# Patient Record
Sex: FEMALE | Race: WHITE | NOT HISPANIC OR LATINO | Employment: UNEMPLOYED | URBAN - METROPOLITAN AREA
[De-identification: names, ages, dates, MRNs, and addresses within clinical notes are randomized per-mention and may not be internally consistent; named-entity substitution may affect disease eponyms.]

---

## 2018-08-11 ENCOUNTER — HOSPITAL ENCOUNTER (EMERGENCY)
Facility: HOSPITAL | Age: 48
Discharge: HOME | End: 2018-08-11
Attending: EMERGENCY MEDICINE
Payer: MEDICARE

## 2018-08-11 VITALS
SYSTOLIC BLOOD PRESSURE: 127 MMHG | BODY MASS INDEX: 27.82 KG/M2 | OXYGEN SATURATION: 97 % | RESPIRATION RATE: 16 BRPM | DIASTOLIC BLOOD PRESSURE: 74 MMHG | WEIGHT: 157 LBS | TEMPERATURE: 98 F | HEIGHT: 63 IN | HEART RATE: 70 BPM

## 2018-08-11 DIAGNOSIS — K59.00 CONSTIPATION, UNSPECIFIED CONSTIPATION TYPE: Primary | ICD-10-CM

## 2018-08-11 LAB
B-HCG UR QL: NEGATIVE
BACTERIA #/AREA URNS HPF: NORMAL /HPF
BILIRUB UR QL STRIP: NEGATIVE
CLARITY UR: ABNORMAL
COLOR UR: YELLOW
CTP QC/QA: YES
GLUCOSE UR QL STRIP: NEGATIVE
HGB UR QL STRIP: NEGATIVE
KETONES UR QL STRIP: NEGATIVE
LEUKOCYTE ESTERASE UR QL STRIP: NEGATIVE
MICROSCOPIC COMMENT: NORMAL
NITRITE UR QL STRIP: NEGATIVE
PH UR STRIP: 6 [PH] (ref 5–8)
PROT UR QL STRIP: NEGATIVE
SP GR UR STRIP: 1.01 (ref 1–1.03)
SQUAMOUS #/AREA URNS HPF: 6 /HPF
URN SPEC COLLECT METH UR: ABNORMAL
UROBILINOGEN UR STRIP-ACNC: NEGATIVE EU/DL
WBC #/AREA URNS HPF: 1 /HPF (ref 0–5)

## 2018-08-11 PROCEDURE — 25000003 PHARM REV CODE 250: Performed by: NURSE PRACTITIONER

## 2018-08-11 PROCEDURE — 99284 EMERGENCY DEPT VISIT MOD MDM: CPT | Mod: 25

## 2018-08-11 PROCEDURE — 81025 URINE PREGNANCY TEST: CPT | Performed by: NURSE PRACTITIONER

## 2018-08-11 PROCEDURE — 81000 URINALYSIS NONAUTO W/SCOPE: CPT

## 2018-08-11 RX ORDER — ONDANSETRON 4 MG/1
4 TABLET, FILM COATED ORAL EVERY 6 HOURS PRN
Qty: 12 TABLET | Refills: 0 | Status: SHIPPED | OUTPATIENT
Start: 2018-08-11

## 2018-08-11 RX ORDER — ONDANSETRON 4 MG/1
4 TABLET, ORALLY DISINTEGRATING ORAL
Status: COMPLETED | OUTPATIENT
Start: 2018-08-11 | End: 2018-08-11

## 2018-08-11 RX ORDER — MONTELUKAST SODIUM 10 MG/1
10 TABLET ORAL NIGHTLY
COMMUNITY

## 2018-08-11 RX ORDER — FLUOXETINE 10 MG/1
10 CAPSULE ORAL DAILY
COMMUNITY

## 2018-08-11 RX ADMIN — ONDANSETRON 4 MG: 4 TABLET, ORALLY DISINTEGRATING ORAL at 11:08

## 2018-08-11 NOTE — ED TRIAGE NOTES
Patient reports abdominal pain, NVD after having an increase in her Prozac from 10 mg to 30 mg.  Patient spoke with her PCP to inform her of her symptoms but could not get an appointment so she came to the ED. Denies fever.

## 2018-08-11 NOTE — DISCHARGE INSTRUCTIONS
One of the best ways to help treat constipation is to increase your fiber intake. You can do this either through diet or by using fiber supplements. Fiber (in whole grains, fruits, and vegetables) adds bulk and absorbs water to soften the stool.     Over-the-counter product to help ease your constipation.    Exercise helps improve the working of your colon which helps ease constipation.     Follow-up with primary care physician    Return to the Emergency Room if symptoms worsen or any other concerns.

## 2018-08-11 NOTE — ED PROVIDER NOTES
Encounter Date: 8/11/2018       History     Chief Complaint   Patient presents with    Abdominal Pain     reports intermittent abdominal pain x 2 wks since prozac dosage was increased; one emesis episode this morning       Abdominal Pain   The current episode started several weeks ago. The onset of the illness was gradual (PCP increase patient Prozac from 20mg to 30mg 3 weeks ago). The problem has not changed since onset.The abdominal pain is generalized. The abdominal pain does not radiate. The severity of the abdominal pain is 6/10. The abdominal pain is relieved by nothing. The other symptoms of the illness include nausea and vomiting (1 episoded this AM). The other symptoms of the illness do not include fever, fatigue, jaundice, shortness of breath, diarrhea, dysuria, vaginal discharge or vaginal bleeding. Primary symptoms comment: constipation.   Nausea began more than 1 week ago (intermittently). The nausea is associated with prescription drugs.   The vomiting began today. Vomiting occurred once. The emesis contains stomach contents.   The patient states that she believes she is currently not pregnant. The patient has not had a change in bowel habit. Additional symptoms associated with the illness include constipation. Symptoms associated with the illness do not include chills, anorexia, diaphoresis, heartburn, urgency, hematuria, frequency or back pain.     Review of patient's allergies indicates:   Allergen Reactions    Baclofen     Codeine      Past Medical History:   Diagnosis Date    Anxiety     Asthma     Depression     Hypertension      Past Surgical History:   Procedure Laterality Date    FOOT SURGERY Left      History reviewed. No pertinent family history.  Social History   Substance Use Topics    Smoking status: Never Smoker    Smokeless tobacco: Not on file    Alcohol use No     Review of Systems   Constitutional: Negative for chills, diaphoresis, fatigue and fever.   HENT: Negative for  congestion, ear discharge, ear pain, sinus pain, sinus pressure, sore throat and trouble swallowing.    Eyes: Negative for pain.   Respiratory: Negative for chest tightness and shortness of breath.    Cardiovascular: Negative for chest pain, palpitations and leg swelling.   Gastrointestinal: Positive for abdominal pain, constipation, nausea and vomiting (1 episoded this AM). Negative for anal bleeding, anorexia, blood in stool, diarrhea, heartburn, jaundice and rectal pain.   Genitourinary: Negative for dysuria, frequency, hematuria, urgency, vaginal bleeding and vaginal discharge.   Musculoskeletal: Negative for back pain, neck pain and neck stiffness.   Skin: Negative for rash.   Neurological: Negative for dizziness, facial asymmetry, weakness, light-headedness, numbness and headaches.   Psychiatric/Behavioral: Negative for agitation, behavioral problems, confusion and decreased concentration.       Physical Exam     Initial Vitals [08/11/18 1049]   BP Pulse Resp Temp SpO2   (!) 142/76 70 20 98.1 °F (36.7 °C) 99 %      MAP       --         Physical Exam    Nursing note and vitals reviewed.  Constitutional: Vital signs are normal. She appears well-developed and well-nourished. She is cooperative.  Non-toxic appearance.   HENT:   Head: Normocephalic and atraumatic.   Right Ear: Hearing and external ear normal.   Left Ear: Hearing and external ear normal.   Nose: Nose normal.   Mouth/Throat: Oropharynx is clear and moist.   Eyes: EOM are normal. Pupils are equal, round, and reactive to light.   Neck: Neck supple. No JVD present.   Cardiovascular: Normal rate, regular rhythm, normal heart sounds and intact distal pulses. Exam reveals no decreased pulses.    Pulses:       Carotid pulses are 2+ on the right side, and 2+ on the left side.       Radial pulses are 2+ on the right side, and 2+ on the left side.        Dorsalis pedis pulses are 2+ on the right side, and 2+ on the left side.   Pulmonary/Chest: Effort normal  and breath sounds normal.   Abdominal: Soft. Normal appearance and bowel sounds are normal. She exhibits no distension and no mass. There is no tenderness. There is no rigidity, no rebound, no guarding, no CVA tenderness, no tenderness at McBurney's point and negative Hardy's sign.   Musculoskeletal: Normal range of motion.   Neurological: She is alert and oriented to person, place, and time. She has normal strength. She displays a negative Romberg sign.   Skin: Skin is warm. Capillary refill takes less than 2 seconds. No rash noted.   Psychiatric: She has a normal mood and affect. Her speech is normal and behavior is normal. Thought content normal.         ED Course   Procedures  Labs Reviewed - No data to display       Imaging Results    None          Medical Decision Making:   Initial Assessment:   This is an evaluation of a 47 y.o. female that presents to the Emergency Department for Abdominal Pain. Patient baseline noted to have some sort of mental challenges. Patient able to answer questions appropriately. Mother at bedside. Mother noticed symptoms after PCP increased Prozac dosage. Physical Exam shows a non-toxic, afebrile, and well appearing female. Abdomen soft/nontender with palpation.  Bowel sounds are present in all 4 quadrants. Negative Hardy sign.  No rebound in the lower quadrants.  No tenderness over McBurney's point. No suprapubic tenderness or distention. Good femoral pulses bilaterally. No CVA tenderness.    Vital Signs Are Reassuring. RESULTS: UA negative    Abdominal X-ray shows nonobstructive bowel gas pattern. Moderate stool in the colon.           Differential Diagnosis:   Given the very benign exam, normal laboratory studies, and lack of significant risk factors, I considered, but at this time, do not suspect an appendicitis, ischemic bowel, bowel perforation, bowel obstruction,  pancreatitis, UTI, pyelonephritis, kidney stone, diverticulitis, or cholecystitis.   ED Management:  ED Course:  Zofran. Patient sitting upright in bed smiling and laughing. Steady gait. Patient stable for discharge. D/C Meds: Zofran. Additional D/C Information: Abdominal Pain Precautions. The diagnosis, treatment plan, instructions for follow-up and reevaluation with her PCP as well as ED return precautions were discussed and understanding was verbalized. All questions or concerns have been addressed.     This case was discussed with  Dr. Ahumada who is in agreement with my assessment and plan.      Sneha Carl NP                    Attending Attestation:   Physician Attestation Statement for Resident:  As the supervising MD   Physician Attestation Statement: I have personally seen and examined this patient.   I agree with the above history. -:                      Clinical Impression:   The encounter diagnosis was Constipation, unspecified constipation type.      Disposition:   Disposition: Discharged  Condition: Stable                        Sneha Carl NP  08/11/18 1806       Hugo Ahumada MD  08/11/18 8864

## 2022-12-06 ENCOUNTER — HOSPITAL ENCOUNTER (EMERGENCY)
Facility: HOSPITAL | Age: 52
Discharge: HOME | End: 2022-12-06
Attending: EMERGENCY MEDICINE
Payer: MEDICARE

## 2022-12-06 VITALS
BODY MASS INDEX: 30.12 KG/M2 | HEART RATE: 85 BPM | RESPIRATION RATE: 18 BRPM | OXYGEN SATURATION: 98 % | TEMPERATURE: 98 F | SYSTOLIC BLOOD PRESSURE: 152 MMHG | WEIGHT: 170 LBS | HEIGHT: 63 IN | DIASTOLIC BLOOD PRESSURE: 85 MMHG
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
## 2022-12-06 DIAGNOSIS — S09.90XA INJURY OF HEAD, INITIAL ENCOUNTER: Primary | ICD-10-CM

## 2022-12-06 DIAGNOSIS — S39.92XA TAILBONE INJURY: ICD-10-CM

## 2022-12-06 PROBLEM — F41.9 ANXIETY AND DEPRESSION: Status: ACTIVE | Noted: 2019-04-02

## 2022-12-06 PROBLEM — E78.2 MIXED HYPERLIPIDEMIA: Status: ACTIVE | Noted: 2020-01-21

## 2022-12-06 PROBLEM — F41.9 ANXIETY: Status: ACTIVE | Noted: 2022-12-06

## 2022-12-06 PROBLEM — F32.A ANXIETY AND DEPRESSION: Status: ACTIVE | Noted: 2019-04-02

## 2022-12-06 PROBLEM — I10 HTN (HYPERTENSION), BENIGN: Status: ACTIVE | Noted: 2018-12-26

## 2022-12-06 PROBLEM — J45.909 ASTHMA: Status: ACTIVE | Noted: 2019-01-10

## 2022-12-06 PROBLEM — G80.9 CEREBRAL PALSY: Status: ACTIVE | Noted: 2018-12-26

## 2022-12-06 PROBLEM — N39.3 SUI (STRESS URINARY INCONTINENCE, FEMALE): Status: ACTIVE | Noted: 2019-01-10

## 2022-12-06 PROBLEM — G56.01 CARPAL TUNNEL SYNDROME, RIGHT UPPER LIMB: Status: ACTIVE | Noted: 2020-12-10

## 2022-12-06 PROBLEM — N94.6 DYSMENORRHEA: Status: ACTIVE | Noted: 2018-11-07

## 2022-12-06 PROBLEM — F33.0 MAJOR DEPRESSIVE DISORDER, RECURRENT EPISODE, MILD: Status: ACTIVE | Noted: 2019-01-10

## 2022-12-06 PROBLEM — M79.641 PAIN IN RIGHT HAND: Status: ACTIVE | Noted: 2020-10-22

## 2022-12-06 PROCEDURE — 29125 APPL SHORT ARM SPLINT STATIC: CPT | Mod: RT

## 2022-12-06 PROCEDURE — 25000003 PHARM REV CODE 250: Performed by: PHYSICIAN ASSISTANT

## 2022-12-06 PROCEDURE — 99284 EMERGENCY DEPT VISIT MOD MDM: CPT | Mod: 25

## 2022-12-06 RX ORDER — ACETAMINOPHEN 500 MG
500 TABLET ORAL
Status: COMPLETED | OUTPATIENT
Start: 2022-12-06 | End: 2022-12-06

## 2022-12-06 RX ORDER — IBUPROFEN 600 MG/1
600 TABLET ORAL EVERY 6 HOURS PRN
Qty: 20 TABLET | Refills: 0 | Status: SHIPPED | OUTPATIENT
Start: 2022-12-06 | End: 2022-12-11

## 2022-12-06 RX ORDER — ACETAMINOPHEN 500 MG
500 TABLET ORAL EVERY 4 HOURS PRN
Qty: 20 TABLET | Refills: 0 | Status: SHIPPED | OUTPATIENT
Start: 2022-12-06 | End: 2022-12-11

## 2022-12-06 RX ORDER — LIDOCAINE 50 MG/G
1 PATCH TOPICAL DAILY
Qty: 15 PATCH | Refills: 0 | Status: SHIPPED | OUTPATIENT
Start: 2022-12-06 | End: 2022-12-21

## 2022-12-06 RX ADMIN — ACETAMINOPHEN 500 MG: 500 TABLET ORAL at 04:12

## 2022-12-06 NOTE — ED PROVIDER NOTES
Encounter Date: 12/6/2022    SCRIBE #1 NOTE: I, Erica Soria, am scribing for, and in the presence of,  MICHAEL Ley. I have scribed the following portions of the note - Other sections scribed: HPI, ROS.     History     Chief Complaint   Patient presents with    Fall     Pt reports was getting into car and fell backwards hitting the back of her head on concrete. No loc. Pt c/o ha and right wrist pain. 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 Denies blood thinner use     Jase Real is a 52 y.o. female, with a PMHx of HTN and Asthma, cerebral palsy who presents to the ED with fall onset 1 hour ago. Patient reports she was trying to get into her car when she fell backwards and hit her head on the concrete. Patient states she has a headache, right wrist pain, and tailbone pain with a severity rate of 8/10. She notes she injured her wrist when she tried to break her fall. Patient denies taking any medication for the pain. No other exacerbating or alleviating factors. Denies syncope, dizziness, neck pain, chest pain, abdominal pain, numbness, tingling, or other associated symptoms. Denies endorsing blood thinners.     The history is provided by the patient.   Review of patient's allergies indicates:   Allergen Reactions    Baclofen     Codeine     Iodine Rash     Past Medical History:   Diagnosis Date    Anxiety     Asthma     Depression     Hypertension      Past Surgical History:   Procedure Laterality Date    FOOT SURGERY Left      History reviewed. No pertinent family history.  Social History     Tobacco Use    Smoking status: Never   Substance Use Topics    Alcohol use: No     Review of Systems   Constitutional:         Positive for fall. Positive for head injury.    Cardiovascular:  Negative for chest pain.   Gastrointestinal:  Negative for abdominal pain.   Musculoskeletal:  Positive for arthralgias (Right wrist pain). Negative for neck pain.        Positive for tailbone pain.    Neurological:  Positive  for headaches. Negative for dizziness, syncope and numbness.        Negative for tingling.      Physical Exam     Initial Vitals [12/06/22 1554]   BP Pulse Resp Temp SpO2   129/73 100 18 98.4 °F (36.9 °C) 97 %      MAP       --         Physical Exam    Nursing note and vitals reviewed.  Constitutional: She appears well-developed and well-nourished.   HENT:   Head: Normocephalic.   Right Ear: Hearing, tympanic membrane, external ear and ear canal normal.   Left Ear: Hearing, tympanic membrane, external ear and ear canal normal.   Nose: Nose normal.   Mouth/Throat: Oropharynx is clear and moist.   Eyes: Conjunctivae, EOM and lids are normal. Pupils are equal, round, and reactive to light.   Cardiovascular:  Normal rate and regular rhythm.     Exam reveals no gallop and no friction rub.       No murmur heard.  Pulmonary/Chest: Breath sounds normal. She has no wheezes. She has no rhonchi. She has no rales.   Abdominal: Abdomen is soft. Bowel sounds are normal. She exhibits no distension. There is no abdominal tenderness. There is no rebound, no guarding, no tenderness at McBurney's point and negative Hardy's sign.   Musculoskeletal:         General: Normal range of motion.     Lymphadenopathy:     She has no cervical adenopathy.   Neurological: She is alert. She has normal strength. No cranial nerve deficit or sensory deficit.   Chronic spasm to L hand  Decreased strength 3/5 to the left hand-chronic  Normal strength of right hand   Skin: Skin is warm and dry.   Psychiatric: She has a normal mood and affect.       ED Course   Procedures  Labs Reviewed   POCT URINE PREGNANCY          Imaging Results              X-Ray Wrist Complete Right (Final result)  Result time 12/06/22 17:42:21      Final result by Megan Wright MD (12/06/22 17:42:21)                   Impression:      No definite acute bony abnormality detected.      Electronically signed by: Megan Wright  Date:    12/06/2022  Time:    17:42                Narrative:    EXAMINATION:  THREE VIEWS OF THE RIGHT WRIST    CLINICAL HISTORY:  Pain.    TECHNIQUE:  AP, oblique, and lateral view of the right wrist    COMPARISON:  None.    FINDINGS:  Three views of the right wrist demonstrate no definite acute fracture or dislocation.  There is a subtle oblique lucency at the medial cortex of the proximal phalanx of the index finger on the AP view, which is presumably a nutrient canal or artifact rather than a nondisplaced fracture..  Recommend clinical correlation for point tenderness.                                       X-Ray Sacrum And Coccyx (Final result)  Result time 12/06/22 17:50:47      Final result by Megan Wright MD (12/06/22 17:50:47)                   Impression:      As above described.      Electronically signed by: Megan Wright  Date:    12/06/2022  Time:    17:50               Narrative:    EXAMINATION:  XR SACRUM AND COCCYX    CLINICAL HISTORY:  Unspecified injury of lower back, initial encounter    TECHNIQUE:  AP and lateral view of the sacrum and coccyx    COMPARISON:  None.    FINDINGS:  Two views of the sacrum and coccyx demonstrate no definite acute fracture or dislocation.  If clinical findings are at a proportion to the radiological findings, consider CT or MRI when clinically appropriate.  Facet arthrosis is seen at the visualized lower lumbar spine and marginal osteophytes consistent with degenerative change.                                       CT Head Without Contrast (Final result)  Result time 12/06/22 16:44:38      Final result by Senthil Villalobos MD (12/06/22 16:44:38)                   Impression:      1.  No acute intracranial process.    2.  Encephalomalacia in the right frontal lobe with associated area of calcification.      Electronically signed by: Senthil Villalobos MD  Date:    12/06/2022  Time:    16:44               Narrative:    EXAMINATION:  CT HEAD WITHOUT CONTRAST    CLINICAL HISTORY:  Head trauma,  moderate-severe;    TECHNIQUE:  Low dose axial images were obtained through the head.  Coronal and sagittal reformations were also performed. Contrast was not administered.    COMPARISON:  No comparison is available.    FINDINGS:  The subcutaneous tissue unremarkable.  The bony calvarium is intact.  The paranasal sinuses are unremarkable.  The mastoid air cells are clear.  The orbits and intraorbital contents are within normal limits.    The craniocervical junction is intact.  The midline structures are unremarkable.  There is encephalomalacia in the right superior frontal lobe.  There is a calcification in the right superior frontal lobe.  The ventricles and sulci are within normal limits.  The cisterns are unremarkable.  The gray-white differentiation is maintained.  There is no dense vessel sign.  There is no evidence of mass effect.                                       CT Cervical Spine Without Contrast (Final result)  Result time 12/06/22 16:53:18      Final result by Senthil Villalobos MD (12/06/22 16:53:18)                   Impression:      No evidence of acute fracture or traumatic malalignment of cervical spine.      Electronically signed by: Senthil Villalobos MD  Date:    12/06/2022  Time:    16:53               Narrative:    EXAMINATION:  CT CERVICAL SPINE WITHOUT CONTRAST    CLINICAL HISTORY:  Neck trauma, midline tenderness (Age 16-64y);    TECHNIQUE:  Low dose axial images, sagittal and coronal reformations were performed though the cervical spine.  Contrast was not administered.    COMPARISON:  None    FINDINGS:  The craniocervical junction is intact.  The predental space is maintained.  No prevertebral soft tissue swelling is identified.    There is straightening of normal cervical lordosis.  The vertebral body heights are maintained.  There is hypertrophy of the posterior elements.  There is intervertebral disc space narrowing at the C5-C6 level.  There is suspected mild to moderate narrowing of the spinal  canal at this level.    There is no evidence of acute fracture or traumatic malalignment of the cervical spine.    The soft tissue the neck are unremarkable.  There is no evidence of lymphadenopathy in the neck.  The lung apices are unremarkable.                                       Medications   acetaminophen tablet 500 mg (500 mg Oral Given 12/6/22 1633)     Medical Decision Making:   Clinical Tests:   Radiological Study: Ordered and Reviewed  ED Management:  52-year-old female presenting for evaluation after fall.  No loss of consciousness.  Does have history of cerebral palsy and chronic left-sided weakness.  Denies loss of consciousness, blurry vision, worsening weakness, paresthesias.  Patient is afebrile nontoxic appearing in distress.  Exam above.  CT head and cervical spine ordered from triage negative for skull fracture or intracranial bleed.  Remarkable for encephalomalacia with calcifications likely secondary to cerebral palsy chronic CT cervical spine is negative for fracture dislocation.  Remarkable for straightening of cervical lordosis.  Will discharge with medications for symptomatic treatment.  Follow up with primary care days return ER for worsening symptoms or as needed.        Scribe Attestation:   Scribe #1: I performed the above scribed service and the documentation accurately describes the services I performed. I attest to the accuracy of the note.                   Clinical Impression:   Final diagnoses:  [W01.0XXA] Fall from slip, trip, or stumble, initial encounter  [S39.92XA] Tailbone injury  [S09.90XA] Injury of head, initial encounter (Primary)        ED Disposition Condition    Discharge Stable        EMANUEL, rivera dixon pa-c, personally performed the services described in this documentation. All medical record entries made by the scribe were at my direction and in my presence. I have reviewed the chart and agree that the record reflects my personal performance and is accurate and  complete.   ED Prescriptions       Medication Sig Dispense Start Date End Date Auth. Provider    ibuprofen (ADVIL,MOTRIN) 600 MG tablet Take 1 tablet (600 mg total) by mouth every 6 (six) hours as needed for Pain. 20 tablet 12/6/2022 12/11/2022 Lara Timmons PA-C    acetaminophen (TYLENOL) 500 MG tablet Take 1 tablet (500 mg total) by mouth every 4 (four) hours as needed. 20 tablet 12/6/2022 12/11/2022 Lara Timmons PA-C    LIDOcaine (LIDODERM) 5 % Place 1 patch onto the skin once daily. Remove & Discard patch within 12 hours or as directed by MD. May use 4% over the counter if not covered by insurance for 15 days 15 patch 12/6/2022 12/21/2022 Lara Timmons PA-C          Follow-up Information       Follow up With Specialties Details Why Contact Info    Your Primary Care Doctor  Schedule an appointment as soon as possible for a visit in 2 days      [facility] Emergency Dept Emergency Medicine Go to  As needed, If symptoms worsen [id number]  [facility] [id number] [phone]             Lara Timmons PA-C  12/06/22 1956

## 2022-12-06 NOTE — FIRST PROVIDER EVALUATION
"Medical screening examination initiated.  I have conducted a focused provider triage encounter, findings are as follows:    Brief history of present illness:  headache, and right wrist pain after falling backwards and striking her head on the concrete. Denies LOC, n/v. Also c/o R wrist pain.    Vitals:    12/06/22 1554   BP: 129/73   Pulse: 100   Resp: 18   Temp: 98.4 °F (36.9 °C)   TempSrc: Oral   SpO2: 97%   Weight: 77.1 kg (170 lb)   Height: 5' 3" (1.6 m)       Pertinent physical exam: Awake and alert. No distress.    Brief workup plan:  Imaging     Preliminary workup initiated; this workup will be continued and followed by the physician or advanced practice provider that is assigned to the patient when roomed.  "

## 2022-12-07 NOTE — DISCHARGE INSTRUCTIONS

## 2025-02-11 DIAGNOSIS — J45.909 ASTHMA: Primary | ICD-10-CM

## 2025-04-13 ENCOUNTER — HOSPITAL ENCOUNTER (EMERGENCY)
Facility: HOSPITAL | Age: 55
Discharge: HOME | End: 2025-04-13
Attending: STUDENT IN AN ORGANIZED HEALTH CARE EDUCATION/TRAINING PROGRAM
Payer: MEDICARE

## 2025-04-13 VITALS
HEIGHT: 63 IN | WEIGHT: 170 LBS | RESPIRATION RATE: 16 BRPM | OXYGEN SATURATION: 97 % | BODY MASS INDEX: 30.12 KG/M2 | TEMPERATURE: 99 F | HEART RATE: 86 BPM | SYSTOLIC BLOOD PRESSURE: 119 MMHG | DIASTOLIC BLOOD PRESSURE: 76 MMHG

## 2025-04-13 DIAGNOSIS — S00.83XA CONTUSION OF FOREHEAD, INITIAL ENCOUNTER: ICD-10-CM

## 2025-04-13 DIAGNOSIS — S09.90XA INJURY OF HEAD, INITIAL ENCOUNTER: Primary | ICD-10-CM

## 2025-04-13 PROCEDURE — 25000003 PHARM REV CODE 250: Mod: ER | Performed by: STUDENT IN AN ORGANIZED HEALTH CARE EDUCATION/TRAINING PROGRAM

## 2025-04-13 PROCEDURE — 99284 EMERGENCY DEPT VISIT MOD MDM: CPT | Mod: 25,ER

## 2025-04-13 RX ORDER — ACETAMINOPHEN 500 MG
1000 TABLET ORAL
Status: COMPLETED | OUTPATIENT
Start: 2025-04-13 | End: 2025-04-13

## 2025-04-13 RX ADMIN — ACETAMINOPHEN 1000 MG: 500 TABLET ORAL at 07:04

## 2025-04-13 NOTE — ED PROVIDER NOTES
Encounter Date: 4/13/2025       History     Chief Complaint   Patient presents with    Head Injury     A 53 y/o female presents to the ER c/o head injury post fall  Pt reports a slip and trip hitting head on the door  -LOC -Anticoagulants.      54 y.o. female who has a past medical history of prior stroke with left-sided deficits, Anxiety, Asthma, Depression, and Hypertension. presents to the emergency department due to headache after a mechanical fall.  Patient reports while in the dining room she slipped and fell onto the ground and on her way hit her head on a door.  Denies any loss of consciousness.  Reports having frontal headache that is rated 4/10 without radiation.  Denies any visual disturbances.  Denies any chest pain abdominal pain or pain in her extremities.  No medications taken prior to ED presentation      The history is provided by the patient and a relative.     Review of patient's allergies indicates:   Allergen Reactions    Baclofen     Codeine     Iodine Rash     Past Medical History:   Diagnosis Date    Anxiety     Asthma     Depression     Hypertension      Past Surgical History:   Procedure Laterality Date    FOOT SURGERY Left      No family history on file.  Social History[1]  Review of Systems   Constitutional:  Negative for fever.   HENT:  Negative for sore throat.    Respiratory:  Negative for shortness of breath.    Cardiovascular:  Negative for chest pain.   Gastrointestinal:  Negative for nausea.   Genitourinary:  Negative for dysuria.   Musculoskeletal:  Negative for back pain.   Skin:  Negative for rash.   Neurological:  Positive for headaches. Negative for weakness.   Hematological:  Does not bruise/bleed easily.       Physical Exam     Initial Vitals [04/13/25 1813]   BP Pulse Resp Temp SpO2   122/73 93 18 98.5 °F (36.9 °C) 96 %      MAP       --         Physical Exam    Nursing note and vitals reviewed.  Constitutional: She is not diaphoretic. No distress.   HENT:   Head:  Normocephalic and atraumatic.   Eyes: Conjunctivae and EOM are normal. Pupils are equal, round, and reactive to light.   Neck:   Normal range of motion.  Pulmonary/Chest: Breath sounds normal. No respiratory distress.   Abdominal: Abdomen is soft. Bowel sounds are normal. She exhibits no distension. There is no abdominal tenderness.   Musculoskeletal:         General: No tenderness. Normal range of motion.      Cervical back: Normal range of motion.     Neurological: She is alert. No cranial nerve deficit or sensory deficit. GCS eye subscore is 4. GCS verbal subscore is 5. GCS motor subscore is 6.   Chronic spastic left upper extremity   Skin: Skin is warm. Capillary refill takes less than 2 seconds.   Psychiatric: Her behavior is normal.         ED Course   Procedures  Labs Reviewed - No data to display       Imaging Results              CT Head Without Contrast (Final result)  Result time 04/13/25 19:53:49      Final result by Senthil Villalobos MD (04/13/25 19:53:49)                   Impression:      No acute intracranial process.  Additional evaluation, as clinically warranted.    Unchanged area of calcification in the right frontal lobe with associated region of encephalomalacia.      Electronically signed by: Senthil Villalobos MD  Date:    04/13/2025  Time:    19:53               Narrative:    EXAMINATION:  CT HEAD WITHOUT CONTRAST    CLINICAL HISTORY:  Head trauma, moderate-severe;    TECHNIQUE:  Low dose axial images were obtained through the head.  Coronal and sagittal reformations were also performed. Contrast was not administered.    COMPARISON:  CT head dated 12/06/2022.    FINDINGS:  The subcutaneous tissues unremarkable.  The bony calvarium is intact.  The paranasal sinuses are unremarkable.  The mastoid air cells are clear.  The orbits and intraorbital contents are within normal limits.    The craniocervical junction is intact.  The sellar and parasellar structures are within normal limits.  There are no  extra-axial fluid collections.  There is no evidence of intracranial hemorrhage.    There is unchanged area of calcification in the right frontal lobe.  There is adjacent area of encephalomalacia.  The remainder of the ventricles and sulci are within normal limits.  The cisterns are unremarkable.  The gray-white differentiation is maintained.  There is no dense vessel sign.  There is nodes of mass effect.                                       Medications   acetaminophen tablet 1,000 mg (1,000 mg Oral Given 4/13/25 1932)     Medical Decision Making:   Initial Assessment:   54 y.o. female who has a past medical history of prior stroke with left-sided deficits, Anxiety, Asthma, Depression, and Hypertension. presents to the emergency department due to headache after a mechanical fall.  Patient in no acute distress.  Exam notable for spastic left upper and lower extremities that is seems to be her baseline.  No obvious head injury.  CT imaging obtained that did not show any acute traumatic intracranial pathology.  Patient given Tylenol with improvement of her symptoms.   The patient is NEXUS negative, without AMS/intoxication, distracting injury, focal bony neck tenderness, or limited neck ROM. There are no significant musculoskeletal deformities warranting further imaging. There is no evidence of chest trauma, decreased breath sounds, or muffled heart sounds to suggest acute intrathoracic injury or warrant further imaging. There is no significant focal abdominal pain, peritoneal signs, or significant bruising to suggest an acute abdomen or warrant further imaging. There is no significant bleeding or bruising to suggest vascular injury. No further imaging or workup is indicated currently.   Differential Diagnosis:   Differential Diagnosis includes, but is not limited to:  Ischemic stroke, hemorrhagic stroke, subarachnoid hemorrhage/ruptured aneurysm, intracranial lesion/mass, meningitis/encephalitis, epidural hematoma,  subdural hematoma, pseudotumor cerebri, venous sinus thrombosis, CO poisoning, hypertensive encephalopathy, MI/ACS, head trauma/contusion, concussion, sinus headache, dehydration, anxiety, medication non-compliance, primary headache (tension/cluster/migraine).   Clinical Tests:   Radiological Study: Ordered and Reviewed             ED Course as of 04/13/25 2004   Sun Apr 13, 2025 1956 CT Head Without Contrast [AS]   2000 Pt is currently stable for discharge. I see no indication of an emergent process beyond that addressed during our encounter but have duly counseled the patient/family regarding the need for prompt follow-up as well as the indications that should prompt immediate return to the emergency room should new or worrisome developments occur. I discussed the ED work up and diagnostic findings with the patient/family. The patient/family has been provided with verbal and printed direction regarding our final diagnosis(es) as well as instructions regarding use of OTC and/or Rx medications intended to manage the patient's aforementioned conditions. The patient/family verbalized an understanding. The patient/family is asked if there are any questions or concerns. We discuss the case, until all issues are addressed to the patient/family's satisfaction. Patient/family understands and is agreeable to the plan.  [AS]      ED Course User Index  [AS] Cherelle De La Torre MD          Medical Decision Making  Amount and/or Complexity of Data Reviewed  Radiology: ordered. Decision-making details documented in ED Course.    Risk  OTC drugs.           Clinical Impression:   Final diagnoses:  [S09.90XA] Injury of head, initial encounter (Primary)  [S00.83XA] Contusion of forehead, initial encounter          ED Disposition Condition    Discharge Stable          ED Prescriptions    None       Follow-up Information       Follow up With Specialties Details Why Contact [facility] ED Emergency Medicine  If  symptoms worsen [address]  [facility] [id number] [phone]    Primary care doctor  Schedule an appointment as soon as possible for a visit  for reassesment             DISCLAIMER: This note was prepared with [employer] voice recognition transcription software. Garbled syntax, mangled pronouns, and other bizarre constructions may be attributed to that software system.         [1]   Social History  Tobacco Use    Smoking status: Never   Substance Use Topics    Alcohol use: No        Cherelle De La Torre MD  04/13/25 2004

## 2025-04-14 NOTE — DISCHARGE INSTRUCTIONS
Thank you for coming to our Emergency Department today. It is important to remember that some problems are difficult to diagnose and may not be found during your first visit. Be sure to follow up with your primary care doctor and review any labs/imaging that was performed with them. If you do not have a primary care doctor, you may contact the one listed on your discharge paperwork or you may also call the [facility] Appointment Desk at [phone] to schedule an appointment with one.     All medications may potentially have side effects and it is impossible to predict which medications may give you side effects. If you feel that you are having a negative effect of any medication you should immediately stop taking them and seek medical attention.    Return to the ER with any questions/concerns, new/concerning symptoms, worsening or failure to improve. Do not drive or make any important decisions for 24 hours if you have received any pain medications, sedatives or mood altering drugs during your ER visit.

## 2025-04-15 ENCOUNTER — OFFICE VISIT (OUTPATIENT)
Dept: PULMONOLOGY | Facility: CLINIC | Age: 55
End: 2025-04-15
Payer: MEDICARE

## 2025-04-15 VITALS
OXYGEN SATURATION: 96 % | HEART RATE: 102 BPM | HEIGHT: 63 IN | BODY MASS INDEX: 32.25 KG/M2 | WEIGHT: 182 LBS | DIASTOLIC BLOOD PRESSURE: 64 MMHG | SYSTOLIC BLOOD PRESSURE: 101 MMHG

## 2025-04-15 DIAGNOSIS — J47.9 BRONCHIECTASIS WITHOUT COMPLICATION: ICD-10-CM

## 2025-04-15 DIAGNOSIS — J45.20 INTERMITTENT ASTHMA, UNSPECIFIED ASTHMA SEVERITY, UNSPECIFIED WHETHER COMPLICATED: ICD-10-CM

## 2025-04-15 DIAGNOSIS — J41.0 SIMPLE CHRONIC BRONCHITIS: Primary | ICD-10-CM

## 2025-04-15 PROCEDURE — 99203 OFFICE O/P NEW LOW 30 MIN: CPT | Mod: S$GLB,,, | Performed by: INTERNAL MEDICINE

## 2025-04-15 PROCEDURE — 3008F BODY MASS INDEX DOCD: CPT | Mod: CPTII,S$GLB,, | Performed by: INTERNAL MEDICINE

## 2025-04-15 PROCEDURE — 99999 PR PBB SHADOW E&M-EST. PATIENT-LVL III: CPT | Mod: PBBFAC,,, | Performed by: INTERNAL MEDICINE

## 2025-04-15 PROCEDURE — 3074F SYST BP LT 130 MM HG: CPT | Mod: CPTII,S$GLB,, | Performed by: INTERNAL MEDICINE

## 2025-04-15 PROCEDURE — 3078F DIAST BP <80 MM HG: CPT | Mod: CPTII,S$GLB,, | Performed by: INTERNAL MEDICINE

## 2025-04-15 PROCEDURE — 1160F RVW MEDS BY RX/DR IN RCRD: CPT | Mod: CPTII,S$GLB,, | Performed by: INTERNAL MEDICINE

## 2025-04-15 PROCEDURE — 1159F MED LIST DOCD IN RCRD: CPT | Mod: CPTII,S$GLB,, | Performed by: INTERNAL MEDICINE

## 2025-04-15 PROCEDURE — 4010F ACE/ARB THERAPY RXD/TAKEN: CPT | Mod: CPTII,S$GLB,, | Performed by: INTERNAL MEDICINE

## 2025-04-15 RX ORDER — BUDESONIDE AND FORMOTEROL FUMARATE DIHYDRATE 160; 4.5 UG/1; UG/1
2 AEROSOL RESPIRATORY (INHALATION) EVERY 12 HOURS
COMMUNITY
End: 2025-04-15

## 2025-04-15 RX ORDER — FLUTICASONE FUROATE, UMECLIDINIUM BROMIDE AND VILANTEROL TRIFENATATE 200; 62.5; 25 UG/1; UG/1; UG/1
POWDER RESPIRATORY (INHALATION)
COMMUNITY
Start: 2025-01-12 | End: 2025-04-15 | Stop reason: ALTCHOICE

## 2025-04-15 RX ORDER — OMEPRAZOLE 20 MG/1
20 CAPSULE, DELAYED RELEASE ORAL DAILY
COMMUNITY

## 2025-04-15 RX ORDER — LEVALBUTEROL TARTRATE 45 UG/1
1-2 AEROSOL, METERED ORAL EVERY 4 HOURS PRN
COMMUNITY
End: 2025-04-15 | Stop reason: SDUPTHER

## 2025-04-15 RX ORDER — BUDESONIDE, GLYCOPYRROLATE, AND FORMOTEROL FUMARATE 160; 9; 4.8 UG/1; UG/1; UG/1
AEROSOL, METERED RESPIRATORY (INHALATION) 2 TIMES DAILY
COMMUNITY
End: 2025-04-15

## 2025-04-15 RX ORDER — UMECLIDINIUM 62.5 UG/1
62.5 AEROSOL, POWDER ORAL DAILY
COMMUNITY
End: 2025-04-15

## 2025-04-15 RX ORDER — LEVALBUTEROL TARTRATE 45 UG/1
1-2 AEROSOL, METERED ORAL EVERY 4 HOURS PRN
Qty: 15 G | Refills: 11 | Status: SHIPPED | OUTPATIENT
Start: 2025-04-15

## 2025-04-15 NOTE — PROGRESS NOTES
General Pulmonary Clinic  New Patient Visit    Chief Complaint: cough     HPI     Jase Real is a 54 y.o. female with hx of CP w/ L sided deficits, asthma, HTN,  presenting with chief complaint of productive cough    HPI    RESPIRATORY SYMPTOMS:  She reports coughing up brown phlegm in the morning for approximately one year, accompanied by shortness of breath without wheezing. These symptoms are isolated to mornings and do not persist throughout the day. 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    ASTHMA:  She was diagnosed with asthma in her 40s, specifically identified as morning asthma by Dr. Paredes and confirmed by Dr. Multani after a breathing treatment. She has a family history of asthma in her brother. She uses Xopenex as needed, approximately 1-2 times weekly, and has declined steroid inhalers citing ineffectiveness.    MEDICAL HISTORY:  She has a history of pneumonia in adulthood, timeframe unknown. 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 She recently experienced a fall at home on Sunday, resulting in a head injury and bruising after hitting her head on a door frame.    SURGICAL HISTORY:  She has undergone foot surgery and surgical removal of cysts from arm.    ALLERGIES:  She has documented allergies to baclofen, codeine, and iodine. Allergy testing revealed sensitivities to trees, grass, mold, corn, and dogs.    CURRENT MEDICATIONS:  She takes omeprazole daily in the morning for acid reflux management.      ROS:  General: -fever, -chills, -fatigue, -weight gain, -weight loss  Eyes: -vision changes, -redness, -discharge  ENT: -ear pain, -nasal congestion, -sore throat, +hoarseness, +sinus pressure, +nosebleeds  Cardiovascular: -chest pain, -palpitations, -lower extremity edema  Respiratory: -cough, +shortness of breath, +productive cough, +exertional  "dyspnea  Gastrointestinal: -abdominal pain, -nausea, -vomiting, -diarrhea, -constipation, -blood in stool, +difficulty swallowing, +heartburn, +indigestion  Genitourinary: -dysuria, -hematuria, -frequency  Musculoskeletal: -joint pain, -muscle pain  Skin: -rash, -lesion  Neurological: -headache, -dizziness, -numbness, -tingling  Psychiatric: -anxiety, -depression, -sleep difficulty  Allergic: +allergic reactions         Objective   Past History     Past Medical History:  Past Medical History:   Diagnosis Date    Anxiety     Asthma     Depression     Hypertension          Past Surgical History:  Past Surgical History:   Procedure Laterality Date    FOOT SURGERY Left          Social History:   Social History     Socioeconomic History    Marital status: Single   Tobacco Use    Smoking status: Never   Substance and Sexual Activity    Alcohol use: No         Family Hx:  No family history of pulmonary fibrosis, pre-mature graying of hair, cirrhosis, or hematologic malignancies  No family history of emphysema or spontaneous PTX  no family history of lung cancer or lymphoma  Brother - asthma    Allergies and Pertinent Medications   Allergies and Medications Reviewed    Baclofen, Codeine, and Iodine  [unfilled]             Physical Exam   /64   Pulse 102   Ht 5' 3" (1.6 m)   Wt 82.6 kg (181 lb 15.8 oz)   SpO2 96%   BMI 32.24 kg/m²       Constitutional: No acute distress, Atraumatic   HEENT: moist mucus membranes, extraocular movements intact  Cardiovascular: regular rate and rhythm, no murmurs, rubs or gallops  Pulmonary: normal respiratory rate and chest rise, no chest wall deformity, normal breath sounds with no wheezing or crackles  Abdominal: non-distended, bowel sounds present  Musculoskeletal: No lower extremity edema, no clubbing  Neurological: normal speech/rosa, L sided weakness  Skin: no finger cyanosis, no rashes on exposed body parts  Psych: Appropriate affect, normal mood       Diagnostic " "Studies      All diagnostic studies relevant to chief complaint reviewed personally    Labs:  No results found for: "WBC", "HGB", "PLT", "MCV"  No results found for: "NA", "K", "CO2", "BUN", "CREATININE", "MG"  No results found for: "AST", "ALT", "ALBUMIN", "PROT", "BILITOT"      PFTs:  Pulmonary Functions Testing Results:  No results found for: "FEV1", "FVC", "ACY4ZXM", "TLC", "DLCO"      FVC FEV1 FEV/FVC TLC DLCO 6MWT Interpret                                                         TTE:  No results found for this or any previous visit.            Assessment and Plan   Jase Real is a 54 y.o. female  presenting with chief complaint of productive cough/chronic bronchitis    Problem List:  # cough bronchitis - chronic, stable - differntial includes cough variant asthma, bronchiectasis, GERD sinus drainage. Concerns for aspiration. Will obtain CT chest  # ? Intermittent asthma - chronic, stable - dx in adulthood, unclear if she actually has asthma given minimal relief w/ inhalers. Continue xopenex PRN, keep diary on use. Check pft       Follow up on April 29th at 4:30 PM to discuss CT results, CT Chest to be scheduled within the next 2 weeks.         Explained to the patient I work Monday-Thursdays, so any results or messages received after working hours Thursday through Monday AM would not be addressed until I was back in the office. If he/she experienced any acute symptoms he/she should go the emergency room for immediate help.    Differential, diagnoses, diagnostic work up, and possible treatments were discussed with the patient. Questions were answered.    Chasity Summers MD  Pulmonary-Critical Care Medicine              For this visit, the following time was spent  preparing to see the patient (e.g., review of tests) 10 minutes  obtaining and/or reviewing separately obtained history 0 minutes  Performing a medically necessary appropriate examination and/or evaluation 12 minutes  Counseling and educating the " patient/family/caregiver 15 minutes  Ordering medications, tests, or procedures 2 minutes  Referring and communicating with other health care professionals (when not reported separately) 0minutes  Documenting clinical information in the electronic or other health record 5minutes  Care coordination (not reported separately) 0minutes    Total time = 44 minutes        This note was generated with the assistance of ambient listening technology. Verbal consent was obtained by the patient and accompanying visitor(s) for the recording of patient appointment to facilitate this note. I attest to having reviewed and edited the generated note for accuracy, though some syntax or spelling errors may persist. Please contact the author of this note for any clarification.

## 2025-04-16 ENCOUNTER — HOSPITAL ENCOUNTER (OUTPATIENT)
Dept: RESPIRATORY THERAPY | Facility: HOSPITAL | Age: 55
Discharge: HOME | End: 2025-04-16
Attending: INTERNAL MEDICINE
Payer: MEDICARE

## 2025-04-16 ENCOUNTER — HOSPITAL ENCOUNTER (OUTPATIENT)
Dept: RADIOLOGY | Facility: HOSPITAL | Age: 55
Discharge: HOME | End: 2025-04-16
Attending: INTERNAL MEDICINE
Payer: MEDICARE

## 2025-04-16 VITALS — RESPIRATION RATE: 20 BRPM | HEART RATE: 115 BPM | OXYGEN SATURATION: 100 %

## 2025-04-16 DIAGNOSIS — R93.3 ABNORMAL FINDING ON GI TRACT IMAGING: ICD-10-CM

## 2025-04-16 DIAGNOSIS — J47.9 BRONCHIECTASIS WITHOUT COMPLICATION: ICD-10-CM

## 2025-04-16 DIAGNOSIS — K44.9 HIATAL HERNIA WITH GERD AND ESOPHAGITIS: Primary | ICD-10-CM

## 2025-04-16 DIAGNOSIS — K21.9 GASTROESOPHAGEAL REFLUX DISEASE, UNSPECIFIED WHETHER ESOPHAGITIS PRESENT: ICD-10-CM

## 2025-04-16 DIAGNOSIS — K21.00 HIATAL HERNIA WITH GERD AND ESOPHAGITIS: Primary | ICD-10-CM

## 2025-04-16 LAB
BRPFT: NORMAL
DLCO ADJ PRE: 12.92 ML/(MIN*MMHG)
DLCO SINGLE BREATH LLN: 17.56
DLCO SINGLE BREATH PRE REF: 55.5 %
DLCO SINGLE BREATH REF: 23.29
DLCOC SBVA LLN: 3.31
DLCOC SBVA PRE REF: 108.7 %
DLCOC SBVA REF: 4.88
DLCOC SINGLE BREATH LLN: 17.56
DLCOC SINGLE BREATH PRE REF: 55.5 %
DLCOC SINGLE BREATH REF: 23.29
DLCOVA LLN: 3.31
DLCOVA PRE REF: 108.7 %
DLCOVA PRE: 5.31 ML/(MIN*MMHG*L)
DLCOVA REF: 4.88
DLVAADJ PRE: 5.31 ML/(MIN*MMHG*L)
ERVN2 LLN: -16449.09
ERVN2 PRE REF: 48.5 %
ERVN2 PRE: 0.44 L
ERVN2 REF: 0.91
FEF 25 75 CHG: -18.5 %
FEF 25 75 LLN: 1.75
FEF 25 75 POST REF: 72.1 %
FEF 25 75 PRE REF: 88.5 %
FEF 25 75 REF: 3.15
FET100 CHG: 91.6 %
FEV1 CHG: 15.5 %
FEV1 FVC CHG: 5.2 %
FEV1 FVC LLN: 69
FEV1 FVC POST REF: 104.8 %
FEV1 FVC PRE REF: 99.6 %
FEV1 FVC REF: 80
FEV1 LLN: 2.02
FEV1 POST REF: 80 %
FEV1 PRE REF: 69.3 %
FEV1 REF: 2.61
FRCN2 LLN: 1.81
FRCN2 PRE REF: 65.3 %
FRCN2 REF: 2.64
FVC CHG: 9.9 %
FVC LLN: 2.54
FVC POST REF: 76 %
FVC PRE REF: 69.2 %
FVC REF: 3.27
IVC PRE: 1.18 L
IVC SINGLE BREATH LLN: 2.54
IVC SINGLE BREATH PRE REF: 35.9 %
IVC SINGLE BREATH REF: 3.27
PEF CHG: -3.2 %
PEF LLN: 4.85
PEF POST REF: 58.9 %
PEF PRE REF: 60.8 %
PEF REF: 6.51
POST FEF 25 75: 2.27 L/S
POST FET 100: 7.8 SEC
POST FEV1 FVC: 84.19 %
POST FEV1: 2.09 L
POST FVC: 2.48 L
POST PEF: 3.83 L/S
PRE DLCO: 12.92 ML/(MIN*MMHG)
PRE FEF 25 75: 2.79 L/S
PRE FET 100: 4.07 SEC
PRE FEV1 FVC: 80.06 %
PRE FEV1: 1.81 L
PRE FRC N2: 1.72 L
PRE FVC: 2.26 L
PRE PEF: 3.96 L/S
RVN2 LLN: 1.15
RVN2 PRE REF: 74.1 %
RVN2 PRE: 1.28 L
RVN2 REF: 1.73
RVN2TLCN2 LLN: 27.05
RVN2TLCN2 PRE REF: 114.2 %
RVN2TLCN2 PRE: 41.83 %
RVN2TLCN2 REF: 36.64
TLCN2 LLN: 3.78
TLCN2 PRE REF: 64.2 %
TLCN2 PRE: 3.06 L
TLCN2 REF: 4.77
VA PRE: 2.44 L
VA SINGLE BREATH LLN: 4.62
VA SINGLE BREATH PRE REF: 52.7 %
VA SINGLE BREATH REF: 4.62
VCMAXN2 LLN: 2.54
VCMAXN2 PRE REF: 54.5 %
VCMAXN2 PRE: 1.78 L
VCMAXN2 REF: 3.27

## 2025-04-16 PROCEDURE — 71250 CT THORAX DX C-: CPT | Mod: TC

## 2025-04-16 PROCEDURE — 94200 LUNG FUNCTION TEST (MBC/MVV): CPT

## 2025-04-16 PROCEDURE — 71250 CT THORAX DX C-: CPT | Mod: 26,,, | Performed by: RADIOLOGY

## 2025-04-16 PROCEDURE — 25000242 PHARM REV CODE 250 ALT 637 W/ HCPCS: Performed by: INTERNAL MEDICINE

## 2025-04-16 PROCEDURE — 94729 DIFFUSING CAPACITY: CPT

## 2025-04-16 RX ORDER — AMOXICILLIN AND CLAVULANATE POTASSIUM 875; 125 MG/1; MG/1
1 TABLET, FILM COATED ORAL 2 TIMES DAILY
Qty: 14 TABLET | Refills: 0 | Status: SHIPPED | OUTPATIENT
Start: 2025-04-16 | End: 2025-04-23

## 2025-04-16 RX ORDER — ALBUTEROL SULFATE 2.5 MG/.5ML
2.5 SOLUTION RESPIRATORY (INHALATION) ONCE
Status: COMPLETED | OUTPATIENT
Start: 2025-04-16 | End: 2025-04-16

## 2025-04-16 RX ADMIN — ALBUTEROL SULFATE 2.5 MG: 2.5 SOLUTION RESPIRATORY (INHALATION) at 08:04

## 2025-04-29 ENCOUNTER — OFFICE VISIT (OUTPATIENT)
Dept: PULMONOLOGY | Facility: CLINIC | Age: 55
End: 2025-04-29
Payer: MEDICARE

## 2025-04-29 VITALS
HEART RATE: 97 BPM | HEIGHT: 63 IN | OXYGEN SATURATION: 98 % | DIASTOLIC BLOOD PRESSURE: 79 MMHG | BODY MASS INDEX: 31.97 KG/M2 | WEIGHT: 180.44 LBS | SYSTOLIC BLOOD PRESSURE: 132 MMHG

## 2025-04-29 DIAGNOSIS — K21.00 GASTROESOPHAGEAL REFLUX DISEASE WITH ESOPHAGITIS WITHOUT HEMORRHAGE: ICD-10-CM

## 2025-04-29 DIAGNOSIS — J41.0 SIMPLE CHRONIC BRONCHITIS: ICD-10-CM

## 2025-04-29 DIAGNOSIS — J98.4 PNEUMONITIS: Primary | ICD-10-CM

## 2025-04-29 PROCEDURE — 99214 OFFICE O/P EST MOD 30 MIN: CPT | Mod: S$GLB,,, | Performed by: INTERNAL MEDICINE

## 2025-04-29 PROCEDURE — 4010F ACE/ARB THERAPY RXD/TAKEN: CPT | Mod: CPTII,S$GLB,, | Performed by: INTERNAL MEDICINE

## 2025-04-29 PROCEDURE — 3075F SYST BP GE 130 - 139MM HG: CPT | Mod: CPTII,S$GLB,, | Performed by: INTERNAL MEDICINE

## 2025-04-29 PROCEDURE — 3078F DIAST BP <80 MM HG: CPT | Mod: CPTII,S$GLB,, | Performed by: INTERNAL MEDICINE

## 2025-04-29 PROCEDURE — 3008F BODY MASS INDEX DOCD: CPT | Mod: CPTII,S$GLB,, | Performed by: INTERNAL MEDICINE

## 2025-04-29 PROCEDURE — 1159F MED LIST DOCD IN RCRD: CPT | Mod: CPTII,S$GLB,, | Performed by: INTERNAL MEDICINE

## 2025-04-29 PROCEDURE — 1160F RVW MEDS BY RX/DR IN RCRD: CPT | Mod: CPTII,S$GLB,, | Performed by: INTERNAL MEDICINE

## 2025-04-29 PROCEDURE — 99999 PR PBB SHADOW E&M-EST. PATIENT-LVL III: CPT | Mod: PBBFAC,,, | Performed by: INTERNAL MEDICINE

## 2025-04-29 RX ORDER — PREDNISONE 10 MG/1
TABLET ORAL
Qty: 24 TABLET | Refills: 0 | Status: SHIPPED | OUTPATIENT
Start: 2025-04-29 | End: 2025-05-11

## 2025-04-29 RX ORDER — OMEPRAZOLE 20 MG/1
20 CAPSULE, DELAYED RELEASE ORAL
Qty: 180 CAPSULE | Refills: 0 | Status: SHIPPED | OUTPATIENT
Start: 2025-04-29 | End: 2025-07-28

## 2025-04-29 RX ORDER — OMEPRAZOLE 20 MG/1
20 CAPSULE, DELAYED RELEASE ORAL
Qty: 180 CAPSULE | Refills: 3 | Status: SHIPPED | OUTPATIENT
Start: 2025-04-29 | End: 2025-04-29

## 2025-04-29 NOTE — Clinical Note
Hi! Needs labs scheduled for this Friday. I tried to give her sputum cups with requistions that say home collect can she drop these off that day? I did not have labels. Anywaywe can ask GI Dr. Chang to see her earlier in July -- kind of urgent. Malena 10 follow up.

## 2025-04-29 NOTE — PROGRESS NOTES
General Pulmonary Clinic  Follow Up Patient Visit      Chief Complaint: cough     HPI     Jase Real is a 54 y.o. female with hx of CP w/ L sided deficits, asthma, HTN,  presenting with chief complaint of productive cough    Interval hx:  PFTs w/ restriction and DLCO reduction no obstruction  CT chest Large hiatal hernia w/ esophageal thickening and R paraesophageal LN  Patchy GGO in the  left lung and RLL  Started on augmentin x 7 days  Minimal improvement in symptoms - -continues to cough up brown phlegm    Presenting HPI    RESPIRATORY SYMPTOMS:  She reports coughing up brown phlegm in the morning for approximately one year, accompanied by shortness of breath without wheezing. These symptoms are isolated to mornings and do not persist throughout the day. 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    ASTHMA:  She was diagnosed with asthma in her 40s, specifically identified as morning asthma by Dr. Paredes and confirmed by Dr. Multani after a breathing treatment. She has a family history of asthma in her brother. She uses Xopenex as needed, approximately 1-2 times weekly, and has declined steroid inhalers citing ineffectiveness.    MEDICAL HISTORY:  She has a history of pneumonia in adulthood, timeframe unknown. 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 She recently experienced a fall at home on Sunday, resulting in a head injury and bruising after hitting her head on a door frame.    SURGICAL HISTORY:  She has undergone foot surgery and surgical removal of cysts from arm.    ALLERGIES:  She has documented allergies to baclofen, codeine, and iodine. Allergy testing revealed sensitivities to trees, grass, mold, corn, and dogs.    CURRENT MEDICATIONS:  She takes omeprazole daily in the morning for acid reflux management.      ROS:  General: -fever, -chills, -fatigue, -weight gain, -weight  "loss  Eyes: -vision changes, -redness, -discharge  ENT: -ear pain, -nasal congestion, -sore throat, +hoarseness, +sinus pressure, +nosebleeds  Cardiovascular: -chest pain, -palpitations, -lower extremity edema  Respiratory: -cough, +shortness of breath, +productive cough, +exertional dyspnea  Gastrointestinal: -abdominal pain, -nausea, -vomiting, -diarrhea, -constipation, -blood in stool, +difficulty swallowing, +heartburn, +indigestion  Genitourinary: -dysuria, -hematuria, -frequency  Musculoskeletal: -joint pain, -muscle pain  Skin: -rash, -lesion  Neurological: -headache, -dizziness, -numbness, -tingling  Psychiatric: -anxiety, -depression, -sleep difficulty  Allergic: +allergic reactions         Objective   Past History     Past Medical History:  Past Medical History:   Diagnosis Date    Anxiety     Asthma     Depression     Hypertension          Past Surgical History:  Past Surgical History:   Procedure Laterality Date    FOOT SURGERY Left          Social History:   Social History     Socioeconomic History    Marital status: Single   Tobacco Use    Smoking status: Never   Substance and Sexual Activity    Alcohol use: No         Family Hx:  No family history of pulmonary fibrosis, pre-mature graying of hair, cirrhosis, or hematologic malignancies  No family history of emphysema or spontaneous PTX  no family history of lung cancer or lymphoma  Brother - asthma    Allergies and Pertinent Medications   Allergies and Medications Reviewed    Baclofen, Codeine, and Iodine  [unfilled]             Physical Exam   /79   Pulse 97   Ht 5' 3" (1.6 m)   Wt 81.8 kg (180 lb 7.1 oz)   SpO2 98%   BMI 31.96 kg/m²       Constitutional: No acute distress, Atraumatic   HEENT: moist mucus membranes, extraocular movements intact  Cardiovascular: regular rate and rhythm, no murmurs, rubs or gallops  Pulmonary: normal respiratory rate and chest rise, no chest wall deformity, normal breath sounds with no wheezing or " "crackles  Abdominal: non-distended, bowel sounds present  Musculoskeletal: No lower extremity edema, no clubbing  Neurological: normal speech/rosa, L sided weakness  Skin: no finger cyanosis, no rashes on exposed body parts  Psych: Appropriate affect, normal mood       Diagnostic Studies      All diagnostic studies relevant to chief complaint reviewed personally    Labs:  No results found for: "WBC", "HGB", "PLT", "MCV"  No results found for: "NA", "K", "CO2", "BUN", "CREATININE", "MG"  No results found for: "AST", "ALT", "ALBUMIN", "PROT", "BILITOT"      PFTs:  Pulmonary Functions Testing Results:  No results found for: "FEV1", "FVC", "[id number]", "TLC", "DLCO"       TTE:  No results found for this or any previous visit.            Assessment and Plan   Jase Real is a 54 y.o. female  presenting with chief complaint of productive cough/chronic bronchitis w/ evidence of pneumonitis on CT chest    Problem List:  # pneumonitis -- acute, stable - multilobar, largely affecting left lung and RLL. In setting of hiatal hernia and GERD concerning for aspiration pneumonitis. Less concerned for acute infection given no improvement on augmentin and chronic nature of cough, although atypical bacteria is a consideration. Will expand w/u to include autoimmune etiologies  - Will plan for prednisone taper 40 mg x 2 days -> 30 mg x 2 days -> 20 mg x 2 days -> 15 mg x 2 days -> 10 mg x 2 days -> 5 mg x 2 days  - aspiration/GERD w/u  as below  - check autoimmune markers, HP panel    # cough bronchitis - chronic, stable - no clear obstruction on pfts. dx in adulthood, unclear if she actually has asthma given minimal relief w/ inhalers  - sputum cultures  - xopenex prn    # aspiration/GERD w/ hiatal hernia and esophageal thickening - increase omeprazole 20 mg bid. Reached out to Dr. Chang for earlier appt        Explained to the patient I work Monday-Thursdays, so any results or messages received after working hours Thursday " through Monday AM would not be addressed until I was back in the office. If he/she experienced any acute symptoms he/she should go the emergency room for immediate help.    Differential, diagnoses, diagnostic work up, and possible treatments were discussed with the patient. Questions were answered.    Chasity Summers MD  Pulmonary-Critical Care Medicine              For this visit, the following time was spent  preparing to see the patient (e.g., review of tests) 10 minutes  obtaining and/or reviewing separately obtained history 0 minutes  Performing a medically necessary appropriate examination and/or evaluation 10 minutes  Counseling and educating the patient/family/caregiver 10 minutes  Ordering medications, tests, or procedures 2 minutes  Referring and communicating with other health care professionals (when not reported separately) 0minutes  Documenting clinical information in the electronic or other health record 5minutes  Care coordination (not reported separately) 0minutes    Total time = 37 minutes

## 2025-04-30 ENCOUNTER — TELEPHONE (OUTPATIENT)
Dept: INFECTIOUS DISEASES | Facility: CLINIC | Age: 55
End: 2025-04-30
Payer: MEDICARE

## 2025-04-30 ENCOUNTER — TELEPHONE (OUTPATIENT)
Dept: PULMONOLOGY | Facility: CLINIC | Age: 55
End: 2025-04-30
Payer: MEDICARE

## 2025-04-30 PROBLEM — K21.00 GASTROESOPHAGEAL REFLUX DISEASE WITH ESOPHAGITIS WITHOUT HEMORRHAGE: Status: ACTIVE | Noted: 2025-04-30

## 2025-04-30 PROBLEM — J41.0 SIMPLE CHRONIC BRONCHITIS: Status: ACTIVE | Noted: 2025-04-30

## 2025-04-30 PROBLEM — J98.4 PNEUMONITIS: Status: ACTIVE | Noted: 2025-04-30

## 2025-04-30 NOTE — TELEPHONE ENCOUNTER
Spoke with mom and schedule her labs, I will send GI a message to get her set up for a sooner appt.                  ----- Message from Chasity Summers MD sent at 4/29/2025  4:46 PM CDT -----  Hi! Needs labs scheduled for this Friday. I tried to give her sputum cups with requistions that say home collect can she drop these off that day? I did not have labels. Anywaywe can ask NAYELI Chang to see her earlier in July -- kind of urgent. Malena 10 follow up.

## 2025-05-01 ENCOUNTER — TELEPHONE (OUTPATIENT)
Dept: GASTROENTEROLOGY | Facility: CLINIC | Age: 55
End: 2025-05-01
Payer: MEDICARE

## 2025-05-01 NOTE — TELEPHONE ENCOUNTER
----- Message from Med Assistant Iddanya sent at 4/30/2025 10:13 AM CDT -----  Appointment is needed for the above patient. Can you give the patient a call to set up an appointment per Dr. Gaspar, Dr. Gaspar states its a ASAP.Yulisa, MAPmiriamm/Sleep [facility] P#[phone]

## 2025-05-01 NOTE — TELEPHONE ENCOUNTER
MA called/LVM for patient to call the office at her convenience in regards to a sooner appointment.

## 2025-05-02 ENCOUNTER — LAB VISIT (OUTPATIENT)
Dept: LAB | Facility: HOSPITAL | Age: 55
End: 2025-05-02
Attending: INTERNAL MEDICINE
Payer: MEDICARE

## 2025-05-02 DIAGNOSIS — J98.4 PNEUMONITIS: ICD-10-CM

## 2025-05-02 LAB
CRP SERPL-MCNC: 8.6 MG/L
CYCLIC CITRULLINATED PEPTIDE (CCP) (OHS): 3.2 U/ML
ERYTHROCYTE [SEDIMENTATION RATE] IN BLOOD BY PHOTOMETRIC METHOD: 35 MM/HR
RHEUMATOID FACT SERPL-ACNC: <13 IU/ML

## 2025-05-02 PROCEDURE — 86038 ANTINUCLEAR ANTIBODIES: CPT

## 2025-05-02 PROCEDURE — 86431 RHEUMATOID FACTOR QUANT: CPT

## 2025-05-02 PROCEDURE — 36415 COLL VENOUS BLD VENIPUNCTURE: CPT

## 2025-05-02 PROCEDURE — 86140 C-REACTIVE PROTEIN: CPT

## 2025-05-02 PROCEDURE — 86235 NUCLEAR ANTIGEN ANTIBODY: CPT

## 2025-05-02 PROCEDURE — 85652 RBC SED RATE AUTOMATED: CPT

## 2025-05-02 PROCEDURE — 86200 CCP ANTIBODY: CPT

## 2025-05-02 PROCEDURE — 83520 IMMUNOASSAY QUANT NOS NONAB: CPT

## 2025-05-05 ENCOUNTER — LAB VISIT (OUTPATIENT)
Dept: LAB | Facility: HOSPITAL | Age: 55
End: 2025-05-05
Attending: INTERNAL MEDICINE
Payer: MEDICARE

## 2025-05-05 DIAGNOSIS — J98.4 PNEUMONITIS: ICD-10-CM

## 2025-05-05 PROCEDURE — 87205 SMEAR GRAM STAIN: CPT

## 2025-05-05 PROCEDURE — 87206 SMEAR FLUORESCENT/ACID STAI: CPT

## 2025-05-05 PROCEDURE — 87116 MYCOBACTERIA CULTURE: CPT

## 2025-05-05 PROCEDURE — 87102 FUNGUS ISOLATION CULTURE: CPT

## 2025-05-06 ENCOUNTER — OFFICE VISIT (OUTPATIENT)
Dept: GASTROENTEROLOGY | Facility: CLINIC | Age: 55
End: 2025-05-06
Payer: MEDICARE

## 2025-05-06 VITALS
HEART RATE: 102 BPM | SYSTOLIC BLOOD PRESSURE: 108 MMHG | HEIGHT: 63 IN | BODY MASS INDEX: 31.84 KG/M2 | DIASTOLIC BLOOD PRESSURE: 68 MMHG | WEIGHT: 179.69 LBS

## 2025-05-06 DIAGNOSIS — K21.9 GASTROESOPHAGEAL REFLUX DISEASE, UNSPECIFIED WHETHER ESOPHAGITIS PRESENT: Primary | ICD-10-CM

## 2025-05-06 DIAGNOSIS — K44.9 HIATAL HERNIA: ICD-10-CM

## 2025-05-06 LAB
ANA (OHS): NORMAL
W SCLERODERMA (SCL-70) ANTIBODY: <0.6 U/ML

## 2025-05-06 PROCEDURE — 4010F ACE/ARB THERAPY RXD/TAKEN: CPT | Mod: CPTII,S$GLB,,

## 2025-05-06 PROCEDURE — 3008F BODY MASS INDEX DOCD: CPT | Mod: CPTII,S$GLB,,

## 2025-05-06 PROCEDURE — 3078F DIAST BP <80 MM HG: CPT | Mod: CPTII,S$GLB,,

## 2025-05-06 PROCEDURE — 3074F SYST BP LT 130 MM HG: CPT | Mod: CPTII,S$GLB,,

## 2025-05-06 PROCEDURE — 99204 OFFICE O/P NEW MOD 45 MIN: CPT | Mod: S$GLB,,,

## 2025-05-06 PROCEDURE — 99999 PR PBB SHADOW E&M-EST. PATIENT-LVL III: CPT | Mod: PBBFAC,,,

## 2025-05-06 PROCEDURE — 1159F MED LIST DOCD IN RCRD: CPT | Mod: CPTII,S$GLB,,

## 2025-05-06 RX ORDER — ESCITALOPRAM OXALATE 10 MG/1
10 TABLET ORAL DAILY
COMMUNITY

## 2025-05-06 RX ORDER — AMITRIPTYLINE HYDROCHLORIDE 150 MG/1
150 TABLET ORAL NIGHTLY
COMMUNITY

## 2025-05-06 RX ORDER — DIAZEPAM 2 MG/1
2 TABLET ORAL
COMMUNITY
Start: 2025-04-13

## 2025-05-06 NOTE — PROGRESS NOTES
"    [facility] Consultation Note    Reason for Consult:  The primary encounter diagnosis was Gastroesophageal reflux disease, unspecified whether esophagitis present. A diagnosis of Hiatal hernia was also pertinent to this visit.    PCP:   Unable, To Obtain   [address] / [place] [address]    Referring MD:  Chasity Summers Md  [address] [address]    HPI:  This is a 54 y.o. female here for evaluation of GERD and abnormal CT findings of distal esophageal thickening and a large hiatal hernia. She endorses intermittent chronic cough, sometimes dry and sometimes coughing brown phlegm. Pt also reports chronic hoarse voice. She states she was having heartburn, but hasn't been lately. States she was on omeprazole 20 mg once daily, but this was increased to BID a few days ago. She has been taking it before meals. Pt denies abdominal pain, dysphagia, odynophagia, regurgitation, excess belching, N/V, change in bowel habits, rectal bleeding, melena, or unintentional weight loss. Denies tobacco and alcohol use. Denies known FHx of GI malignancies.     Objective Findings:    Vital Signs:  /68   Pulse 102   Ht 5' 3" (1.6 m)   Wt 81.5 kg (179 lb 10.8 oz)   BMI 31.83 kg/m²   Body mass index is 31.83 kg/m².    Physical Exam:  General Appearance: Well appearing in no acute distress  Abdomen: Soft, non tender, non distended in all four quadrants. No hepatosplenomegaly, ascites, or mass.    I have personally reviewed labs and imaging results.     CT Chest Without Contrast (04/16/2025):  Impression:   Multi lobe bronchopneumonia   Large hiatal hernia and distal esophageal wall thickening.  Findings may be inflammatory in nature related to reflux esophagitis although other etiologies are possible.  Consider further evaluation with barium esophagram or endoscopy.    Assessment:  1. Gastroesophageal reflux disease, unspecified whether esophagitis present    2. Hiatal hernia  "     This is a 54 y.o F here for large HH and distal esophageal thickening seen on CT. She has Sx of reflux, including heartburn (improved w omeprazole 20mg BID), hoarseness, and cough. No prior endoscopy. Discussed use of Bravo with patient but she deferred at this time. She would like to only proceed with EGD to evaluate.     - Ordered EGD  - Continue Omeprazole 20mg BID before meals  - May increase to 40mg BID  - Discussed dietary and lifestyle changes with patient    RTC as needed    Thank you so much for allowing me to participate in the care of Kyle Ann Armstrong Sarah Abukhader, PA-C [facility]

## 2025-05-07 LAB
ACID FAST MOD KINY STN SPEC: NORMAL
BACTERIA SPEC CULT: ABNORMAL
GRAM STN SPEC: ABNORMAL

## 2025-05-08 ENCOUNTER — CLINICAL SUPPORT (OUTPATIENT)
Dept: ENDOSCOPY | Facility: HOSPITAL | Age: 55
End: 2025-05-08
Payer: MEDICARE

## 2025-05-08 ENCOUNTER — TELEPHONE (OUTPATIENT)
Dept: ENDOSCOPY | Facility: HOSPITAL | Age: 55
End: 2025-05-08

## 2025-05-08 DIAGNOSIS — K21.9 GASTROESOPHAGEAL REFLUX DISEASE, UNSPECIFIED WHETHER ESOPHAGITIS PRESENT: ICD-10-CM

## 2025-05-08 DIAGNOSIS — R93.3 ABNORMAL FINDING ON GI TRACT IMAGING: ICD-10-CM

## 2025-05-08 LAB — AR MISCELLANEOUS TEST 1 RESULT: NORMAL

## 2025-05-08 NOTE — TELEPHONE ENCOUNTER
Spoke to pt's mother, Eileen.  Patient is scheduled for a Upper Endoscopy (EGD) on 6/3/25 with Dr. YO Diaz  Referral for procedure from PAT appointment          EGD Procedure Prep Instructions      Date of procedure: 6/3/25 Arrive at: 6:30AM      Location of Department: [address] [place], [place] [address] [address]  Take the Elevators to 2nd Floor Endoscopy Procedural Area    How to prep:    Day Before Procedure: 6/2/25     You may have a light evening meal.   No solid food after 7:00 pm.   Continue drinking clear liquids.       Day of the Procedure:  6/3/25     You may have water/clear liquids until 4 hours before your procedure or as directed by the scheduling nurse  3:30AM. See below for list.    What You CANNOT do:   Do not drink milk or anything colored red.  Do not drink alcohol.  No gum chewing or candy morning of procedure.    Liquids That Are OK to Drink:   Water  Sports drinks (Gatorade, Power-Aid)  Coffee or tea (no cream or nondairy creamer)  Clear juices without pulp (apple, white grape)  Gelatin desserts (no fruit or toppings)  Clear soda (sprite, coke, ginger ale)  Chicken broth (until 12 midnight the night before procedure)      Comments:        IMPORTANT INFORMATION TO KNOW BEFORE YOUR PROCEDURE    [facility] 2nd Floor       When you arrive:  If your procedure is Monday - Friday 5am - 7pm - Please enter through the front door near [facility]. Please proceed up the first set of elevators to the 2nd floor where you will check in at the endo registration desk.     If your procedure is on a Saturday (weekend), enter through the back Outpatient entrance. Please note this entrance is diagonal from the Emergency Department entrance.              If your procedure requires the administration of anesthesia, it is necessary for a responsible adult to drive you home. (Medical Transportation, Uber, Lyft, Taxi, etc. may ONLY be used if a responsible adult is present to accompany you  home.  The responsible adult CAN'T be the  of the service).      person must be available to return to pick you up within 15 minutes of being notified of discharge.       Please bring a picture ID, insurance card, & copayment      Take Medications as directed below:      If you begin taking any blood thinning medications, injectable weight loss/diabetes medications (other than insulin) , or Adipex (Phentermine) please contact the endoscopy scheduling department listed below as soon as possible.    If you are diabetic see the attached instruction sheet regarding your medication.     If you take HEART, BLOOD PRESSURE, SEIZURE, PAIN, LUNG (including inhalers/nebulizers), ANTI-REJECTION (transplant patients), or PSYCHIATRIC medications, please take at your regular times with a sip of water or as directed by the scheduling nurse.     Important contact information:    Endoscopy [phone] Hours of operation Monday-Friday 8:00-4:30pm.    Questions about insurance or financial obligations call [phone] or [phone].    If you have questions regarding the prep or need to reschedule, please call [phone]. After hours questions requiring immediate assistance, contact [facility] On-Call nurse line at [phone] or [phone].   NOTE:     On occasion, unforeseen circumstances may cause a delay in your procedure start time. We respect your time and appreciate your patience during these circumstances.      Comments:

## 2025-05-09 LAB — W RNA POLYMERASE III: <20 UNITS

## 2025-05-12 ENCOUNTER — TELEPHONE (OUTPATIENT)
Dept: ENDOSCOPY | Facility: HOSPITAL | Age: 55
End: 2025-05-12
Payer: MEDICARE

## 2025-05-12 NOTE — TELEPHONE ENCOUNTER
Patient is rescheduled for a Upper Endoscopy (EGD) on 5/23/25 with Dr. АННА Casillas  Referral for procedure from [facility] appointment

## 2025-05-23 ENCOUNTER — HOSPITAL ENCOUNTER (OUTPATIENT)
Facility: HOSPITAL | Age: 55
Discharge: HOME | End: 2025-05-23
Attending: INTERNAL MEDICINE | Admitting: INTERNAL MEDICINE
Payer: MEDICARE

## 2025-05-23 ENCOUNTER — ANESTHESIA EVENT (OUTPATIENT)
Dept: ENDOSCOPY | Facility: HOSPITAL | Age: 55
End: 2025-05-23
Payer: MEDICARE

## 2025-05-23 ENCOUNTER — ANESTHESIA (OUTPATIENT)
Dept: ENDOSCOPY | Facility: HOSPITAL | Age: 55
End: 2025-05-23
Payer: MEDICARE

## 2025-05-23 DIAGNOSIS — K44.9 HIATAL HERNIA: ICD-10-CM

## 2025-05-23 DIAGNOSIS — K21.00 GASTROESOPHAGEAL REFLUX DISEASE WITH ESOPHAGITIS WITHOUT HEMORRHAGE: Primary | ICD-10-CM

## 2025-05-23 DIAGNOSIS — K20.90 ESOPHAGITIS: ICD-10-CM

## 2025-05-23 DIAGNOSIS — K21.9 GERD (GASTROESOPHAGEAL REFLUX DISEASE): ICD-10-CM

## 2025-05-23 PROCEDURE — 37000008 HC ANESTHESIA 1ST 15 MINUTES: Performed by: INTERNAL MEDICINE

## 2025-05-23 PROCEDURE — 37000009 HC ANESTHESIA EA ADD 15 MINS: Performed by: INTERNAL MEDICINE

## 2025-05-23 PROCEDURE — 25000003 PHARM REV CODE 250

## 2025-05-23 PROCEDURE — 43235 EGD DIAGNOSTIC BRUSH WASH: CPT | Performed by: INTERNAL MEDICINE

## 2025-05-23 PROCEDURE — 63600175 PHARM REV CODE 636 W HCPCS

## 2025-05-23 PROCEDURE — 43235 EGD DIAGNOSTIC BRUSH WASH: CPT | Mod: ,,, | Performed by: INTERNAL MEDICINE

## 2025-05-23 RX ORDER — PROPOFOL 10 MG/ML
VIAL (ML) INTRAVENOUS
Status: DISCONTINUED
Start: 2025-05-23 | End: 2025-05-23 | Stop reason: HOSPADM

## 2025-05-23 RX ORDER — PROPOFOL 10 MG/ML
VIAL (ML) INTRAVENOUS
Status: DISCONTINUED | OUTPATIENT
Start: 2025-05-23 | End: 2025-05-23

## 2025-05-23 RX ORDER — PANTOPRAZOLE SODIUM 40 MG/1
40 TABLET, DELAYED RELEASE ORAL 2 TIMES DAILY
Qty: 180 TABLET | Refills: 3 | Status: SHIPPED | OUTPATIENT
Start: 2025-05-23

## 2025-05-23 RX ORDER — LIDOCAINE HYDROCHLORIDE 20 MG/ML
INJECTION INTRAVENOUS
Status: DISCONTINUED | OUTPATIENT
Start: 2025-05-23 | End: 2025-05-23

## 2025-05-23 RX ORDER — LIDOCAINE HYDROCHLORIDE 20 MG/ML
INJECTION, SOLUTION EPIDURAL; INFILTRATION; INTRACAUDAL; PERINEURAL
Status: DISCONTINUED
Start: 2025-05-23 | End: 2025-05-23 | Stop reason: HOSPADM

## 2025-05-23 RX ORDER — GLYCOPYRROLATE 0.2 MG/ML
INJECTION INTRAMUSCULAR; INTRAVENOUS
Status: DISCONTINUED
Start: 2025-05-23 | End: 2025-05-23 | Stop reason: HOSPADM

## 2025-05-23 RX ADMIN — PROPOFOL 100 MG: 10 INJECTION, EMULSION INTRAVENOUS at 12:05

## 2025-05-23 RX ADMIN — PROPOFOL 20 MG: 10 INJECTION, EMULSION INTRAVENOUS at 12:05

## 2025-05-23 RX ADMIN — SODIUM CHLORIDE: 0.9 INJECTION, SOLUTION INTRAVENOUS at 12:05

## 2025-05-23 RX ADMIN — LIDOCAINE HYDROCHLORIDE 100 MG: 20 INJECTION, SOLUTION INTRAVENOUS at 12:05

## 2025-05-23 RX ADMIN — GLYCOPYRROLATE 0.1 MG: 0.2 INJECTION, SOLUTION INTRAMUSCULAR; INTRAVITREAL at 12:05

## 2025-05-23 NOTE — H&P
Short Stay Endoscopy History and Physical    PCP - Unable, To Obtain     Procedure - EGD  ASA - per anesthesia  Mallampati - per anesthesia  History of Anesthesia problems - no  Family history Anesthesia problems -  no   Plan of anesthesia - General    HPI:  This is a 54 y.o. female here for evaluation of : GERD, hiatal hernia        ROS:  Constitutional: No fevers, chills, No weight loss  CV: No chest pain  Pulm: No cough, No shortness of breath  Ophtho: No vision changes  GI: see HPI  Derm: No rash    Medical History:  has a past medical history of Anxiety, Asthma, Depression, and Hypertension.    Surgical History:  has a past surgical history that includes Foot surgery (Left).    Family History: family history is not on file.. Otherwise no colon cancer, inflammatory bowel disease, or GI malignancies.    Social History:  reports that she has never smoked. She does not have any smokeless tobacco history on file. She reports that she does not drink alcohol.    Review of patient's allergies indicates:   Allergen Reactions    Baclofen     Codeine     Iodine Rash       Medications:   Prescriptions Prior to Admission[1]    Physical Exam:    Vital Signs: There were no vitals filed for this visit.    Gen: NAD, lying comfortably  HENT: NCAT, oropharynx clear  Eyes: anicteric sclerae, EOMI grossly  Neck: supple, no visible masses/goiter  Cardiac: RRR  Lungs: non-labored breathing  Abd: soft, NT/ND, normoactive BS  Ext: no LE edema, warm, well perfused  Skin: skin intact on exposed body parts, no visible rashes, lesions  Neuro: A&Ox4, neuro exam grossly intact, moves all extremities  Psych: appropriate mood, affect        Plan:  EGD for GERD and hiatal hernia    I have explained the risks and benefits of endoscopy procedures to the patient including but not limited to bleeding, perforation, infection, and death.  The patient was asked if they understand and allowed to ask any further questions to their  satisfaction.      Estrella Casillas MD         [1]   Medications Prior to Admission   Medication Sig Dispense Refill Last Dose/Taking    amitriptyline (ELAVIL) 150 MG Tab Take 150 mg by mouth every evening.       cetirizine 10 mg TbDL Take by mouth. (Patient not taking: Reported on 5/6/2025)       diazePAM (VALIUM) 2 MG tablet Take 2 mg by mouth.       EScitalopram oxalate (LEXAPRO) 10 MG tablet Take 10 mg by mouth once daily.       FLUoxetine (PROZAC) 10 MG capsule Take 10 mg by mouth once daily. (Patient not taking: Reported on 5/6/2025)       levalbuterol (XOPENEX HFA) 45 mcg/actuation inhaler Inhale 1-2 puffs into the lungs every 4 (four) hours as needed for Wheezing or Shortness of Breath. Rescue 15 g 11     losartan (COZAAR) 12.5 MG tablet Take 25 mg by mouth once daily.       montelukast (SINGULAIR) 10 mg tablet Take 10 mg by mouth every evening.       omeprazole (PRILOSEC) 20 MG capsule Take 1 capsule (20 mg total) by mouth 2 (two) times daily before meals. 180 capsule 0     ondansetron (ZOFRAN) 4 MG tablet Take 1 tablet (4 mg total) by mouth every 6 (six) hours as needed for Nausea. (Patient not taking: Reported on 5/6/2025) 12 tablet 0

## 2025-05-23 NOTE — ANESTHESIA POSTPROCEDURE EVALUATION
Anesthesia Post Evaluation    Patient: Jase Real    Procedure(s) Performed: Procedure(s) (LRB):  EGD (ESOPHAGOGASTRODUODENOSCOPY) (N/A)    Final Anesthesia Type: general      Patient location during evaluation: GI PACU  Patient participation: Yes- Able to Participate  Level of consciousness: awake and alert and oriented  Post-procedure vital signs: reviewed and stable  Pain management: adequate  Airway patency: patent    PONV status at discharge: No PONV  Anesthetic complications: no      Cardiovascular status: hemodynamically stable and blood pressure returned to baseline  Respiratory status: spontaneous ventilation, room air and unassisted  Hydration status: euvolemic  Follow-up not needed.              Vitals Value Taken Time   /62 05/23/25 13:09   Temp 36.6 °C (97.8 °F) 05/23/25 12:39   Pulse 88 05/23/25 13:09   Resp 12 05/23/25 12:39   SpO2 98 % 05/23/25 13:09         No case tracking events are documented in the log.      Pain/Elke Score: Elke Score: 10 (5/23/2025  1:09 PM)

## 2025-05-23 NOTE — PROVATION PATIENT INSTRUCTIONS
Discharge Summary/Instructions after an Endoscopic Procedure  Patient Name: Jase Real  Patient MRN: [id number]  Patient YOB: 1970  Friday, May 23, 2025  Estrella Casillas MD  Dear patient,  As a result of recent federal legislation (The Federal Cures Act), you may   receive lab or pathology results from your procedure in your [facility]   account before your physician is able to contact you. Your physician or   their representative will relay the results to you with their   recommendations at their soonest availability.  Thank you,  RESTRICTIONS:  During your procedure today, you received medications for sedation.  These   medications may affect your judgment, balance and coordination.  Therefore,   for 24 hours, you have the following restrictions:   - DO NOT drive a car, operate machinery, make legal/financial decisions,   sign important papers or drink alcohol.    ACTIVITY:  Today: no heavy lifting, straining or running due to procedural   sedation/anesthesia.  The following day: return to full activity including work.  DIET:  Eat and drink normally unless instructed otherwise.     TREATMENT FOR COMMON SIDE EFFECTS:  - Mild abdominal pain, nausea, belching, bloating or excessive gas:  rest,   eat lightly and use a heating pad.  - Sore Throat: treat with throat lozenges and/or gargle with warm salt   water.  - Because air was used during the procedure, expelling large amounts of air   from your rectum or belching is normal.  - If a bowel prep was taken, you may not have a bowel movement for 1-3 days.    This is normal.  SYMPTOMS TO WATCH FOR AND REPORT TO YOUR PHYSICIAN:  1. Abdominal pain or bloating, other than gas cramps.  2. Chest pain.  3. Back pain.  4. Signs of infection such as: chills or fever occurring within 24 hours   after the procedure.  5. Rectal bleeding, which would show as bright red, maroon, or black stools.   (A tablespoon of blood from the rectum is not serious, especially if    hemorrhoids are present.)  6. Vomiting.  7. Weakness or dizziness.  GO DIRECTLY TO THE NEAREST EMERGENCY ROOM IF YOU HAVE ANY OF THE FOLLOWING:      Difficulty breathing              Chills and/or fever over 101 F   Persistent vomiting and/or vomiting blood   Severe abdominal pain   Severe chest pain   Black, tarry stools   Bleeding- more than one tablespoon   Any other symptom or condition that you feel may need urgent attention  Your doctor recommends these additional instructions:  If any biopsies were taken, your doctors clinic will contact you in 1 to 2   weeks with any results.  - Discharge patient to home.   - Resume previous diet.   - Continue present medications. Stop Omeprazole.  Start Pantoprazole 40mg   twice per day for 8 weeks.  - Repeat upper endoscopy in 8 weeks to check healing.  For questions, problems or results please call your physician - Estrella Casillas MD at Work:  [phone].  [facility] can be reached at [phone]     IF A COMPLICATION OR EMERGENCY SITUATION ARISES AND YOU ARE UNABLE TO REACH   YOUR PHYSICIAN - GO DIRECTLY TO THE EMERGENCY ROOM.  Estrella Casillas MD  5/23/2025 12:39:16 PM  This report has been verified and signed electronically.  Dear patient,  As a result of recent federal legislation (The Federal Cures Act), you may   receive lab or pathology results from your procedure in your [facility]   account before your physician is able to contact you. Your physician or   their representative will relay the results to you with their   recommendations at their soonest availability.  Thank you,  PROVATION

## 2025-05-23 NOTE — ANESTHESIA PREPROCEDURE EVALUATION
05/23/2025  Jase Real is a 54 y.o., female.  To undergo Procedure(s) (LRB):  EGD (ESOPHAGOGASTRODUODENOSCOPY) (N/A)     Denies CP/SOB/MI/CVA/URI symptoms.  Endorses GERD with certain foods.  METS > 4  NPO > 8    Past Medical History:  Past Medical History:   Diagnosis Date    Anxiety     Asthma     Depression     Hypertension        Past Surgical History:  Past Surgical History:   Procedure Laterality Date    FOOT SURGERY Left        Social History:  Social History[1]    Medications:  Medications Ordered Prior to Encounter[2]    Allergies:  Review of patient's allergies indicates:   Allergen Reactions    Baclofen     Codeine     Iodine Rash       Active Problems:  Problem List[3]    24 Hour Vitals:      See Nursing Charting For Additional Vitals      Pre-op Assessment    I have reviewed the Patient Summary Reports.     I have reviewed the Nursing Notes.       Review of Systems  Anesthesia Hx:  No problems with previous Anesthesia               Denies Personal Hx of Anesthesia complications.                    Social:  No Alcohol Use, Non-Smoker       Cardiovascular:  Exercise tolerance: good   Hypertension           hyperlipidemia                               Pulmonary:    Asthma                    Hepatic/GI:     GERD, well controlled                Neurological:           Cerebral Palsy                            Endocrine:        Obesity / BMI > 30  Psych:   anxiety depression                Physical Exam  General: Well nourished and Cooperative    Airway:  Mallampati: II   Mouth Opening: Normal  TM Distance: Normal    Dental:  Intact    Chest/Lungs:  Clear to auscultation, Normal Respiratory Rate    Heart:  Rate: Normal  Rhythm: Regular Rhythm        Anesthesia Plan  Type of Anesthesia, risks & benefits discussed:    Anesthesia Type: Gen ETT, Gen Natural Airway, MAC  Intra-op Monitoring Plan:  Standard ASA Monitors  Post Op Pain Control Plan: multimodal analgesia  Induction:  IV  Informed Consent: Informed consent signed with the Patient and all parties understand the risks and agree with anesthesia plan.  All questions answered.   ASA Score: 2    Ready For Surgery From Anesthesia Perspective.     .           [1]   Social History  Socioeconomic History    Marital status: Single   Tobacco Use    Smoking status: Never   Substance and Sexual Activity    Alcohol use: No   [2]   No current facility-administered medications on file prior to encounter.     Current Outpatient Medications on File Prior to Encounter   Medication Sig Dispense Refill    amitriptyline (ELAVIL) 150 MG Tab Take 150 mg by mouth every evening.      cetirizine 10 mg TbDL Take by mouth. (Patient not taking: Reported on 5/6/2025)      diazePAM (VALIUM) 2 MG tablet Take 2 mg by mouth.      EScitalopram oxalate (LEXAPRO) 10 MG tablet Take 10 mg by mouth once daily.      FLUoxetine (PROZAC) 10 MG capsule Take 10 mg by mouth once daily. (Patient not taking: Reported on 5/6/2025)      levalbuterol (XOPENEX HFA) 45 mcg/actuation inhaler Inhale 1-2 puffs into the lungs every 4 (four) hours as needed for Wheezing or Shortness of Breath. Rescue 15 g 11    losartan (COZAAR) 12.5 MG tablet Take 25 mg by mouth once daily.      montelukast (SINGULAIR) 10 mg tablet Take 10 mg by mouth every evening.      omeprazole (PRILOSEC) 20 MG capsule Take 1 capsule (20 mg total) by mouth 2 (two) times daily before meals. 180 capsule 0    ondansetron (ZOFRAN) 4 MG tablet Take 1 tablet (4 mg total) by mouth every 6 (six) hours as needed for Nausea. (Patient not taking: Reported on 5/6/2025) 12 tablet 0   [3]   Patient Active Problem List  Diagnosis    Anxiety    Anxiety and depression    Asthma    Carpal tunnel syndrome, right upper limb    Cerebral palsy    Dysmenorrhea    HTN (hypertension), benign    Major depressive disorder, recurrent episode, mild    Mixed  hyperlipidemia    Pain in right hand    VANDANA (stress urinary incontinence, female)    Simple chronic bronchitis    Gastroesophageal reflux disease with esophagitis without hemorrhage    Pneumonitis

## 2025-05-23 NOTE — OR NURSING
Procedure and recovery complete. Pt awake and alert. MD and mother at bedside, procedure findings and suggestions discussed. Discharge instructions given, pt verbalized understanding of instruction. Gait steady, able to ambulate without assistance. Pt walked out accompanied by mother.

## 2025-05-23 NOTE — TRANSFER OF CARE
Anesthesia Transfer of Care Note    Patient: Jase Real    Procedure(s) Performed: Procedure(s) (LRB):  EGD (ESOPHAGOGASTRODUODENOSCOPY) (N/A)    Patient location: GI    Anesthesia Type: general    Transport from OR: Transported from OR on room air with adequate spontaneous ventilation    Post pain: adequate analgesia    Post assessment: no apparent anesthetic complications and tolerated procedure well    Post vital signs: stable    Level of consciousness: responds to stimulation and lethargic    Nausea/Vomiting: no nausea/vomiting    Complications: none    Transfer of care protocol was followed      Last vitals: Visit Vitals  BP (!) 113/59 (BP Location: Right arm, Patient Position: Lying)   Pulse 91   Temp 36.6 °C (97.8 °F) (Temporal)   Resp 12   SpO2 97%   Breastfeeding No

## 2025-05-24 ENCOUNTER — TELEPHONE (OUTPATIENT)
Dept: ENDOSCOPY | Facility: HOSPITAL | Age: 55
End: 2025-05-24
Payer: MEDICARE

## 2025-05-24 DIAGNOSIS — K20.90 ESOPHAGITIS: ICD-10-CM

## 2025-05-24 DIAGNOSIS — K21.00 GASTROESOPHAGEAL REFLUX DISEASE WITH ESOPHAGITIS WITHOUT HEMORRHAGE: Primary | ICD-10-CM

## 2025-05-24 NOTE — TELEPHONE ENCOUNTER
"Diagnosis: Esophagitis     Procedure Timing: in 8 weeks     *If within 4 weeks selected, please leonila as high priority*     *If greater than 12 weeks, please select "5-12 weeks" and delay sending until 3 months prior to requested date*     Location: Hospital Based ([facility], [place] [facility])     Additional Scheduling Information: No scheduling concerns     Prep Specifications:Standard prep     Is the patient taking a GLP-1 Agonist:no     Have you attached a patient to this message: yes   "

## 2025-05-24 NOTE — TELEPHONE ENCOUNTER
"----- Message from Estrella Casillas MD sent at 5/23/2025 12:36 PM CDT -----  Regarding: Repeat EGD in 8 weeks  Procedure: EGDDiagnosis: EsophagitisProcedure Timing: in 8 weeks#If within 4 weeks selected, please leonila as high priority##If greater than 12 weeks, please select "5-12 weeks" and delay sending until 3 months prior to requested date# [facility]Additional Scheduling Information: No scheduling concernsPrep Specifications:Standard prepIs the patient taking a GLP-1 Agonist:noHave you attached a patient to this message: yes  "

## 2025-05-24 NOTE — TELEPHONE ENCOUNTER
Patient is scheduled for a Upper Endoscopy (EGD) on 7/22/25 with Dr. АННА Casillas  Referral for procedure from [facility]

## 2025-05-28 VITALS
RESPIRATION RATE: 12 BRPM | HEART RATE: 88 BPM | SYSTOLIC BLOOD PRESSURE: 128 MMHG | OXYGEN SATURATION: 98 % | DIASTOLIC BLOOD PRESSURE: 62 MMHG | TEMPERATURE: 98 F

## 2025-06-06 ENCOUNTER — TELEPHONE (OUTPATIENT)
Dept: SURGERY | Facility: CLINIC | Age: 55
End: 2025-06-06
Payer: MEDICARE

## 2025-06-10 ENCOUNTER — OFFICE VISIT (OUTPATIENT)
Dept: PULMONOLOGY | Facility: CLINIC | Age: 55
End: 2025-06-10
Payer: MEDICARE

## 2025-06-10 VITALS — HEART RATE: 98 BPM | OXYGEN SATURATION: 97 % | SYSTOLIC BLOOD PRESSURE: 108 MMHG | DIASTOLIC BLOOD PRESSURE: 75 MMHG

## 2025-06-10 DIAGNOSIS — J98.4 PNEUMONITIS: Primary | ICD-10-CM

## 2025-06-10 DIAGNOSIS — R84.5: ICD-10-CM

## 2025-06-10 PROCEDURE — 3074F SYST BP LT 130 MM HG: CPT | Mod: CPTII,S$GLB,, | Performed by: INTERNAL MEDICINE

## 2025-06-10 PROCEDURE — 4010F ACE/ARB THERAPY RXD/TAKEN: CPT | Mod: CPTII,S$GLB,, | Performed by: INTERNAL MEDICINE

## 2025-06-10 PROCEDURE — 3078F DIAST BP <80 MM HG: CPT | Mod: CPTII,S$GLB,, | Performed by: INTERNAL MEDICINE

## 2025-06-10 PROCEDURE — 99999 PR PBB SHADOW E&M-EST. PATIENT-LVL III: CPT | Mod: PBBFAC,,, | Performed by: INTERNAL MEDICINE

## 2025-06-10 PROCEDURE — 99214 OFFICE O/P EST MOD 30 MIN: CPT | Mod: S$GLB,,, | Performed by: INTERNAL MEDICINE

## 2025-06-10 PROCEDURE — 1159F MED LIST DOCD IN RCRD: CPT | Mod: CPTII,S$GLB,, | Performed by: INTERNAL MEDICINE

## 2025-06-10 NOTE — PROGRESS NOTES
General Pulmonary Clinic  Follow Up Patient Visit      Chief Complaint: cough     HPI     Jase Real is a 54 y.o. female with hx of CP w/ L sided deficits, asthma, HTN,  presenting with chief complaint of productive cough    Interval hx:  Underwent EGD w/ grade C esophagitis, 5 cm hiatal hernia, no biopsies  Stopped omeprazole started pantoprazole 40 mg bid  Sputum cultures negative  KATHERYN, RF, CCP anti-Scl, Hp panel neg    Cough has improved  She was referred to see minimally invasive surgery for hiatal repair but cancelled appt as mother cannot drive her to the [place] for surgery    Presenting HPI    RESPIRATORY SYMPTOMS:  She reports coughing up brown phlegm in the morning for approximately one year, accompanied by shortness of breath without wheezing. These symptoms are isolated to mornings and do not persist throughout the day. 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    ASTHMA:  She was diagnosed with asthma in her 40s, specifically identified as morning asthma by Dr. Paredes and confirmed by Dr. Multani after a breathing treatment. She has a family history of asthma in her brother. She uses Xopenex as needed, approximately 1-2 times weekly, and has declined steroid inhalers citing ineffectiveness.    MEDICAL HISTORY:  She has a history of pneumonia in adulthood, timeframe unknown. 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 She recently experienced a fall at home on Sunday, resulting in a head injury and bruising after hitting her head on a door frame.    SURGICAL HISTORY:  She has undergone foot surgery and surgical removal of cysts from arm.    ALLERGIES:  She has documented allergies to baclofen, codeine, and iodine. Allergy testing revealed sensitivities to trees, grass, mold, corn, and dogs.    CURRENT MEDICATIONS:  She takes omeprazole daily in the morning for acid reflux  management.      ROS:  General: -fever, -chills, -fatigue, -weight gain, -weight loss  Eyes: -vision changes, -redness, -discharge  ENT: -ear pain, -nasal congestion, -sore throat, +hoarseness, +sinus pressure, +nosebleeds  Cardiovascular: -chest pain, -palpitations, -lower extremity edema  Respiratory: -cough, +shortness of breath, +productive cough, +exertional dyspnea  Gastrointestinal: -abdominal pain, -nausea, -vomiting, -diarrhea, -constipation, -blood in stool, +difficulty swallowing, +heartburn, +indigestion  Genitourinary: -dysuria, -hematuria, -frequency  Musculoskeletal: -joint pain, -muscle pain  Skin: -rash, -lesion  Neurological: -headache, -dizziness, -numbness, -tingling  Psychiatric: -anxiety, -depression, -sleep difficulty  Allergic: +allergic reactions         Objective   Past History     Past Medical History:  Past Medical History:   Diagnosis Date    Anxiety     Asthma     Depression     Hypertension          Past Surgical History:  Past Surgical History:   Procedure Laterality Date    ESOPHAGOGASTRODUODENOSCOPY N/A 5/23/2025    Procedure: EGD (ESOPHAGOGASTRODUODENOSCOPY);  Surgeon: Estrella Casillas MD;  Location: [facility];  Service: Gastroenterology;  Laterality: N/A;  Referral: KARELY MIRELES / cece emailed: [email or web address] -   5/12 R/S: cece emailed - LW    FOOT SURGERY Left          Social History:   Social History     Socioeconomic History    Marital status: Single   Tobacco Use    Smoking status: Never   Substance and Sexual Activity    Alcohol use: No         Family Hx:  No family history of pulmonary fibrosis, pre-mature graying of hair, cirrhosis, or hematologic malignancies  No family history of emphysema or spontaneous PTX  no family history of lung cancer or lymphoma  Brother - asthma    Allergies and Pertinent Medications   Allergies and Medications Reviewed    Baclofen, Codeine, and Iodine  [unfilled]             Physical Exam   There were no vitals taken for this  "visit.      Constitutional: No acute distress, Atraumatic   HEENT: moist mucus membranes, extraocular movements intact  Cardiovascular: regular rate and rhythm, no murmurs, rubs or gallops  Pulmonary: normal respiratory rate and chest rise, no chest wall deformity, normal breath sounds with no wheezing or crackles  Abdominal: non-distended, bowel sounds present  Musculoskeletal: No lower extremity edema, no clubbing  Neurological: normal speech/rosa, L sided weakness  Skin: no finger cyanosis, no rashes on exposed body parts  Psych: Appropriate affect, normal mood       Diagnostic Studies      All diagnostic studies relevant to chief complaint reviewed personally    Labs:  No results found for: "WBC", "HGB", "PLT", "MCV"  No results found for: "NA", "K", "CO2", "BUN", "CREATININE", "MG"  No results found for: "AST", "ALT", "ALBUMIN", "PROT", "BILITOT"      PFTs:  Pulmonary Functions Testing Results:  No results found for: "FEV1", "FVC", "MNV3JNU", "TLC", "DLCO"       TTE:  No results found for this or any previous visit.            Assessment and Plan   Jase Real is a 54 y.o. female  presenting with chief complaint of productive cough/chronic bronchitis w/ evidence of pneumonitis on CT chest    Problem List:  # pneumonitis -- acute, stable - multilobar, largely affecting left lung and RLL. In setting of hiatal hernia and GERD concerning for aspiration pneumonitis. Autoimmune testing negative. She is s/p augmentin and prednisone taper, then PPI w/ improvement in cough  - repeat CT chest in 7/2025    # cough bronchitis - chronic, stable - no clear obstruction on pfts. dx in adulthood, unclear if she actually has asthma given minimal relief w/ inhalers  - follow up on sputum cultures  - xopenex prn    # aspiration/GERD w/ hiatal hernia and esophageal thickening - on PPI emphasized that her breathing issues are likely 2/2 to hernia. Recommend follow up w/ them and reconsideration of surgical repair    "     Explained to the patient I work Monday-Thursdays, so any results or messages received after working hours Thursday through Monday AM would not be addressed until I was back in the office. If he/she experienced any acute symptoms he/she should go the emergency room for immediate help.    Differential, diagnoses, diagnostic work up, and possible treatments were discussed with the patient. Questions were answered.    Chasity Summers MD  Pulmonary-Critical Care Medicine              For this visit, the following time was spent  preparing to see the patient (e.g., review of tests) 10 minutes  obtaining and/or reviewing separately obtained history 0 minutes  Performing a medically necessary appropriate examination and/or evaluation 10 minutes  Counseling and educating the patient/family/caregiver 10 minutes  Ordering medications, tests, or procedures 2 minutes  Referring and communicating with other health care professionals (when not reported separately) 0minutes  Documenting clinical information in the electronic or other health record 5minutes  Care coordination (not reported separately) 0minutes    Total time = 37 minutes

## 2025-06-12 PROBLEM — R84.5: Status: ACTIVE | Noted: 2025-06-12

## 2025-06-18 ENCOUNTER — TELEPHONE (OUTPATIENT)
Dept: PULMONOLOGY | Facility: CLINIC | Age: 55
End: 2025-06-18
Payer: MEDICARE

## 2025-06-18 NOTE — TELEPHONE ENCOUNTER
Returned call no answer.                      ----- Message from Med Assistant Idget sent at 6/17/2025  3:23 PM CDT -----  Communication  .Type: Patient Call Back        Who called: Self        What is the request in detail: called to let the doctor know that she now has a ride and wants to know what she has to do to get her surgery scheduled        Can the clinic reply by [facility]? No        Would the patient rather a call back or a response via [facility]? Call        Best call back number: [phone]            Additional Information:

## 2025-06-19 ENCOUNTER — LAB VISIT (OUTPATIENT)
Dept: LAB | Facility: HOSPITAL | Age: 55
End: 2025-06-19
Attending: INTERNAL MEDICINE
Payer: MEDICARE

## 2025-06-19 ENCOUNTER — HOSPITAL ENCOUNTER (OUTPATIENT)
Dept: RESPIRATORY THERAPY | Facility: HOSPITAL | Age: 55
Discharge: HOME | End: 2025-06-19
Payer: MEDICARE

## 2025-06-19 DIAGNOSIS — J98.4 PNEUMONITIS: ICD-10-CM

## 2025-06-19 DIAGNOSIS — R84.5: ICD-10-CM

## 2025-06-19 PROCEDURE — 87116 MYCOBACTERIA CULTURE: CPT

## 2025-06-19 PROCEDURE — 87205 SMEAR GRAM STAIN: CPT

## 2025-06-19 PROCEDURE — 87102 FUNGUS ISOLATION CULTURE: CPT

## 2025-06-19 PROCEDURE — 87206 SMEAR FLUORESCENT/ACID STAI: CPT

## 2025-06-19 NOTE — PROCEDURES
Pt was unable to provide sputum at this time. Provided pt with specimen cup and instruction to bring to lab.

## 2025-06-21 LAB
BACTERIA SPEC CULT: NORMAL
GRAM STN SPEC: NORMAL

## 2025-06-23 LAB — ACID FAST MOD KINY STN SPEC: NORMAL

## 2025-06-26 LAB — FUNGUS SPEC CULT: ABNORMAL

## 2025-06-27 LAB — MYCOBACTERIUM SPEC QL CULT: NORMAL

## 2025-07-09 ENCOUNTER — OFFICE VISIT (OUTPATIENT)
Dept: GASTROENTEROLOGY | Facility: CLINIC | Age: 55
End: 2025-07-09
Payer: MEDICARE

## 2025-07-09 VITALS
DIASTOLIC BLOOD PRESSURE: 82 MMHG | WEIGHT: 178.56 LBS | HEIGHT: 63 IN | SYSTOLIC BLOOD PRESSURE: 123 MMHG | HEART RATE: 94 BPM | BODY MASS INDEX: 31.64 KG/M2

## 2025-07-09 DIAGNOSIS — K44.9 HIATAL HERNIA: ICD-10-CM

## 2025-07-09 DIAGNOSIS — K59.00 CONSTIPATION, UNSPECIFIED CONSTIPATION TYPE: ICD-10-CM

## 2025-07-09 DIAGNOSIS — K21.00 GASTROESOPHAGEAL REFLUX DISEASE WITH ESOPHAGITIS WITHOUT HEMORRHAGE: Primary | ICD-10-CM

## 2025-07-09 PROCEDURE — 3074F SYST BP LT 130 MM HG: CPT | Mod: CPTII,S$GLB,,

## 2025-07-09 PROCEDURE — 1159F MED LIST DOCD IN RCRD: CPT | Mod: CPTII,S$GLB,,

## 2025-07-09 PROCEDURE — 3008F BODY MASS INDEX DOCD: CPT | Mod: CPTII,S$GLB,,

## 2025-07-09 PROCEDURE — 4010F ACE/ARB THERAPY RXD/TAKEN: CPT | Mod: CPTII,S$GLB,,

## 2025-07-09 PROCEDURE — 3079F DIAST BP 80-89 MM HG: CPT | Mod: CPTII,S$GLB,,

## 2025-07-09 PROCEDURE — 99999 PR PBB SHADOW E&M-EST. PATIENT-LVL III: CPT | Mod: PBBFAC,,,

## 2025-07-09 PROCEDURE — 99214 OFFICE O/P EST MOD 30 MIN: CPT | Mod: S$GLB,,,

## 2025-07-09 NOTE — PROGRESS NOTES
"    [facility] Follow-UP Note    Reason for Follow-Up:  The primary encounter diagnosis was Gastroesophageal reflux disease with esophagitis without hemorrhage. Diagnoses of Constipation, unspecified constipation type and Hiatal hernia were also pertinent to this visit.    PCP:   Unable, To Obtain       HPI:  This is a 54 y.o. female last seen in GI clinic on 5/6/2025 for evaluation of GERD and CT finding of distal esophageal thickening with large HH. Pt endorsed symptoms of chronic cough, hoarse voice, and intermittent heartburn. She underwent EGD on 5/23/25 which revealed Grade C esophagitis and 5 cm hiatal hernia. She was switched from omeprazole to pantoprazole 40 mg BID for 8 weeks. Repeat endscopy scheduled for 7/22/2025.     Interval History:  Today, pt presents for follow up. Reports taking her PPI BID as prescribed. She does feel that her symptoms have improved a little. States her cough is now only bothersome every morning upon waking up. She denies coughing fits throughout the day or throughout the night. Voice is less hoarse and she hasn't had any heartburn since her EGD. Denies chest pain or dysphagia. She has appointment with surgeon on Friday to discuss HH repair options.      She endorses new onset constipation, associated with bloating. States she has been having one BM weekly the past few weeks. Denies abdominal pain, loose stools, bloody stools, black stools, or rectal bleeding. She has not attempted treatment.       Objective Findings:    Vital Signs:  /82   Pulse 94   Ht 5' 3" (1.6 m)   Wt 81 kg (178 lb 9.2 oz)   BMI 31.63 kg/m²   Body mass index is 31.63 kg/m².    Physical Exam:  General Appearance: Well appearing in no acute distress  Abdomen: Soft, non tender, non distended in all four quadrants. No hepatosplenomegaly, ascites, or mass    Assessment:  1. Gastroesophageal reflux disease with esophagitis without hemorrhage    2. Constipation, unspecified " constipation type    3. Hiatal hernia       This is a 54 y.o F with GERD with Grade C esophagitis. On PPI BID with scheduled repeat EGD in a couple of weeks. 5 cm hiatal hernia, she is meeting with GS this week to discuss options. Now also with new constipation. Will start bowel regimen.     - Repeat EGD 7/22  - Continue PPI BID  - Reiterated GERD dietary/lifestyle changes  - Recommended Miralax 1-3x/day with daily fiber supplement  - Discussed titration of miralax as needed  - Maintain adequate hydration and high fiber diet.     RTC as needed    Thank you so much for allowing me to participate in the care of Kyle Ann Armstrong Sarah Abukhader, PA-C [facility]

## 2025-07-09 NOTE — PATIENT INSTRUCTIONS
Acid Reflux Tips  - Stay upright for at least 3 hours after eating  - Raise the head of the bed 4 to 6 inches    - Decrease excess weight    - Avoid citrus juices, acidic foods, alcohol, chocolate, caffeinated and carbonated beverages, fatty and fried foods   - Avoid tight-fitting clothing  - Avoid cigarettes and other tobacco products  - Take antacids (E.g. Tums, Rolaids, Maalox, Mylanta) for breakthrough symptoms  - If prescribed a Proton Pump Inhibitor (Protonix, Prilosec, Nexium, etc), take it 30-60 minutes before meals.      Constipation Tips  - Eat more high fiber foods   - Take a Fiber supplement (Metamucil, Benefiber, Citrucell, etc)  - Take Miralax (once, twice, or three times a day)  - Drink at least 8 cups of water a day  - Get regular physical activity  - Use a stool or Squatty Potty  - Avoid sitting on the toilet >5 minutes to prevent hemorrhoids

## 2025-07-11 ENCOUNTER — OFFICE VISIT (OUTPATIENT)
Dept: SURGERY | Facility: CLINIC | Age: 55
End: 2025-07-11
Payer: MEDICARE

## 2025-07-11 VITALS
HEIGHT: 63 IN | WEIGHT: 178.38 LBS | DIASTOLIC BLOOD PRESSURE: 65 MMHG | HEART RATE: 88 BPM | BODY MASS INDEX: 31.61 KG/M2 | SYSTOLIC BLOOD PRESSURE: 135 MMHG

## 2025-07-11 DIAGNOSIS — G80.9 CEREBRAL PALSY, UNSPECIFIED TYPE: ICD-10-CM

## 2025-07-11 DIAGNOSIS — K44.9 HIATAL HERNIA: Primary | ICD-10-CM

## 2025-07-11 DIAGNOSIS — K20.90 ESOPHAGITIS: ICD-10-CM

## 2025-07-11 PROCEDURE — 99999 PR PBB SHADOW E&M-EST. PATIENT-LVL III: CPT | Mod: PBBFAC,,, | Performed by: SURGERY

## 2025-07-11 NOTE — PROGRESS NOTES
"  History & Physical    SUBJECTIVE:     History of Present Illness:  Patient is a 54 y.o. female presents with a 5 cm hiatal hernia and grade C esophagitis.  The patient states that she has noticed significant reflux for the past year.  She was initially started on omeprazole and the symptoms did improve.  She recently had an endoscopy which showed a 5 cm hiatal hernia and grade C esophagitis.  She was changed to pantoprazole at that time.  Her symptoms are currently fairly well controlled.  She does have some dysphagia noted.  She also has worsening symptoms early in the morning after lying flat.  She has changed her diet and has obtained a wedge pillow that she would be sleeping on.  She was referred to us for treatment considering that her grade C esophagitis is on PPIs and she has a 5 cm hiatal hernia.      Review of patient's allergies indicates:   Allergen Reactions    Red dye Other (See Comments)     Red Dye No. 40  Becomes really flush "face became red"    Baclofen     Codeine     Iodine Rash       Current Medications[1]    Past Medical History:   Diagnosis Date    Anxiety     Asthma     Depression     Hypertension      Past Surgical History:   Procedure Laterality Date    ESOPHAGOGASTRODUODENOSCOPY N/A 5/23/2025    Procedure: EGD (ESOPHAGOGASTRODUODENOSCOPY);  Surgeon: Estrella Casillas MD;  Location: [facility];  Service: Gastroenterology;  Laterality: N/A;  Referral: KARELY MIRELES / cece emailed: [email or web address] -   5/12 R/S: cece emailed - LW    FOOT SURGERY Left      No family history on file.  Social History[2]     Review of Systems:  Review of Systems   Constitutional:  Negative for activity change, appetite change, chills, diaphoresis, fatigue and fever.   HENT:  Negative for congestion, sinus pressure, sneezing and sore throat.    Eyes:  Negative for pain, discharge, redness, itching and visual disturbance.   Respiratory:  Negative for apnea, cough, choking, chest tightness and shortness " of breath.    Cardiovascular:  Negative for chest pain, palpitations and leg swelling.   Gastrointestinal:  Negative for abdominal distention, abdominal pain, constipation, diarrhea, nausea and vomiting.   Musculoskeletal:  Negative for arthralgias, back pain and gait problem.   Skin:  Negative for color change, pallor and rash.   Neurological:  Negative for dizziness, facial asymmetry, light-headedness and headaches.   Psychiatric/Behavioral:  Negative for agitation, behavioral problems and confusion.        OBJECTIVE:     Vital Signs (Most Recent)              Physical Exam:  Physical Exam  Constitutional:       General: She is not in acute distress.     Appearance: Normal appearance. She is not diaphoretic.   HENT:      Head: Normocephalic and atraumatic.      Right Ear: External ear normal.      Left Ear: External ear normal.   Eyes:      General: No scleral icterus.        Right eye: No discharge.         Left eye: No discharge.   Cardiovascular:      Rate and Rhythm: Normal rate and regular rhythm.   Pulmonary:      Effort: Pulmonary effort is normal. No respiratory distress.   Musculoskeletal:         General: Normal range of motion.   Skin:     General: Skin is warm and dry.      Coloration: Skin is not pale.      Findings: No erythema or rash.   Neurological:      Mental Status: She is alert and oriented to person, place, and time.   Psychiatric:         Mood and Affect: Mood normal.         Behavior: Behavior normal.         Thought Content: Thought content normal.         Judgment: Judgment normal.         ASSESSMENT/PLAN:     Pt with Hiatal Hernia and Grade C Esophagitis    PLAN:Plan     Will plan for Esophagram.  Will plan for manometry  Will have patient FU in clinic after to discuss repair  Will need PCP Clearance       Nickolas Cullen MD           [1]   Current Outpatient Medications   Medication Sig Dispense Refill    amitriptyline (ELAVIL) 150 MG Tab Take 150 mg by mouth every evening.       diazePAM (VALIUM) 2 MG tablet Take 2 mg by mouth.      EScitalopram oxalate (LEXAPRO) 10 MG tablet Take 10 mg by mouth once daily.      levalbuterol (XOPENEX HFA) 45 mcg/actuation inhaler Inhale 1-2 puffs into the lungs every 4 (four) hours as needed for Wheezing or Shortness of Breath. Rescue 15 g 11    losartan (COZAAR) 12.5 MG tablet Take 25 mg by mouth once daily.      pantoprazole (PROTONIX) 40 MG tablet Take 1 tablet (40 mg total) by mouth 2 (two) times daily. 180 tablet 3     No current facility-administered medications for this visit.   [2]   Social History  Tobacco Use    Smoking status: Never   Substance Use Topics    Alcohol use: No

## 2025-07-15 ENCOUNTER — TELEPHONE (OUTPATIENT)
Dept: ENDOSCOPY | Facility: HOSPITAL | Age: 55
End: 2025-07-15
Payer: MEDICARE

## 2025-07-15 DIAGNOSIS — K21.00 HIATAL HERNIA WITH GERD AND ESOPHAGITIS: Primary | ICD-10-CM

## 2025-07-15 DIAGNOSIS — K44.9 HIATAL HERNIA WITH GERD AND ESOPHAGITIS: Primary | ICD-10-CM

## 2025-07-15 NOTE — TELEPHONE ENCOUNTER
Patient is scheduled for a Esophageal Manometry on 8/28/25 with Dr. BRITTNEY Appiah  Referral for procedure from [facility]

## 2025-07-15 NOTE — TELEPHONE ENCOUNTER
"----- Message from Estrella Casillas MD sent at 5/23/2025 12:36 PM CDT -----  Regarding: Repeat EGD in 8 weeks  Procedure: EGD    Diagnosis: Esophagitis    Procedure Timing: in 8 weeks    #If within 4 weeks selected, please leonila as high priority#    #If greater than 12 weeks, please select "5-12 weeks" and delay sending until 3 months prior to requested date#     [facility]    Additional Scheduling Information: No scheduling concerns    Prep Specifications:Standard prep    Is the patient taking a GLP-1 Agonist:no    Have you attached a patient to this message: yes  "

## 2025-07-15 NOTE — TELEPHONE ENCOUNTER
"----- Message from KRYS Herrera sent at 7/15/2025 11:04 AM CDT -----  Procedure: Manometry    Diagnosis: hiatal hernia with esophagitis     Procedure Timing: Within 4 weeks (Urgent)    #If within 4 weeks selected, please leonila as high priority#    #If greater than 12 weeks, please select "5-12 weeks" and delay sending until 3 months prior to requested date#       Additional Scheduling Information: Pt has CP, please ask for mother (Jada)    Is the patient taking a GLP-1 Agonist and/or blood thinner :no    Have you attached a patient to this message: yes  "

## 2025-07-21 ENCOUNTER — HOSPITAL ENCOUNTER (OUTPATIENT)
Dept: RADIOLOGY | Facility: HOSPITAL | Age: 55
Discharge: HOME | End: 2025-07-21
Attending: INTERNAL MEDICINE
Payer: MEDICARE

## 2025-07-21 ENCOUNTER — ANESTHESIA EVENT (OUTPATIENT)
Dept: ENDOSCOPY | Facility: HOSPITAL | Age: 55
End: 2025-07-21
Payer: MEDICARE

## 2025-07-21 DIAGNOSIS — J98.4 PNEUMONITIS: ICD-10-CM

## 2025-07-21 PROCEDURE — 71250 CT THORAX DX C-: CPT | Mod: TC

## 2025-07-21 PROCEDURE — 71250 CT THORAX DX C-: CPT | Mod: 26,,, | Performed by: STUDENT IN AN ORGANIZED HEALTH CARE EDUCATION/TRAINING PROGRAM

## 2025-07-21 RX ORDER — SODIUM CHLORIDE 9 MG/ML
INJECTION, SOLUTION INTRAVENOUS CONTINUOUS
OUTPATIENT
Start: 2025-07-21

## 2025-07-22 ENCOUNTER — ANESTHESIA (OUTPATIENT)
Dept: ENDOSCOPY | Facility: HOSPITAL | Age: 55
End: 2025-07-22
Payer: MEDICARE

## 2025-07-22 ENCOUNTER — HOSPITAL ENCOUNTER (OUTPATIENT)
Facility: HOSPITAL | Age: 55
Discharge: HOME | End: 2025-07-22
Attending: INTERNAL MEDICINE | Admitting: INTERNAL MEDICINE
Payer: MEDICARE

## 2025-07-22 VITALS
DIASTOLIC BLOOD PRESSURE: 55 MMHG | HEART RATE: 82 BPM | TEMPERATURE: 97 F | SYSTOLIC BLOOD PRESSURE: 144 MMHG | OXYGEN SATURATION: 97 % | RESPIRATION RATE: 13 BRPM

## 2025-07-22 DIAGNOSIS — K20.90 ESOPHAGITIS: Primary | ICD-10-CM

## 2025-07-22 DIAGNOSIS — K21.9 GERD (GASTROESOPHAGEAL REFLUX DISEASE): ICD-10-CM

## 2025-07-22 PROCEDURE — 25000003 PHARM REV CODE 250: Performed by: STUDENT IN AN ORGANIZED HEALTH CARE EDUCATION/TRAINING PROGRAM

## 2025-07-22 PROCEDURE — 43235 EGD DIAGNOSTIC BRUSH WASH: CPT | Mod: GC,,, | Performed by: INTERNAL MEDICINE

## 2025-07-22 PROCEDURE — 43235 EGD DIAGNOSTIC BRUSH WASH: CPT | Performed by: INTERNAL MEDICINE

## 2025-07-22 PROCEDURE — 37000008 HC ANESTHESIA 1ST 15 MINUTES: Performed by: INTERNAL MEDICINE

## 2025-07-22 PROCEDURE — 63600175 PHARM REV CODE 636 W HCPCS: Performed by: STUDENT IN AN ORGANIZED HEALTH CARE EDUCATION/TRAINING PROGRAM

## 2025-07-22 PROCEDURE — 37000009 HC ANESTHESIA EA ADD 15 MINS: Performed by: INTERNAL MEDICINE

## 2025-07-22 RX ORDER — SUCRALFATE 1 G/1
1 TABLET ORAL
Qty: 120 TABLET | Refills: 6 | Status: SHIPPED | OUTPATIENT
Start: 2025-07-22

## 2025-07-22 RX ORDER — PROPOFOL 10 MG/ML
VIAL (ML) INTRAVENOUS
Status: DISCONTINUED | OUTPATIENT
Start: 2025-07-22 | End: 2025-07-22

## 2025-07-22 RX ORDER — LIDOCAINE HYDROCHLORIDE 20 MG/ML
INJECTION INTRAVENOUS
Status: DISCONTINUED | OUTPATIENT
Start: 2025-07-22 | End: 2025-07-22

## 2025-07-22 RX ORDER — LIDOCAINE HYDROCHLORIDE 20 MG/ML
INJECTION, SOLUTION EPIDURAL; INFILTRATION; INTRACAUDAL; PERINEURAL
Status: DISCONTINUED
Start: 2025-07-22 | End: 2025-07-22 | Stop reason: HOSPADM

## 2025-07-22 RX ORDER — PROPOFOL 10 MG/ML
VIAL (ML) INTRAVENOUS
Status: DISCONTINUED
Start: 2025-07-22 | End: 2025-07-22 | Stop reason: HOSPADM

## 2025-07-22 RX ADMIN — LIDOCAINE HYDROCHLORIDE 100 MG: 20 INJECTION, SOLUTION INTRAVENOUS at 08:07

## 2025-07-22 RX ADMIN — PROPOFOL 40 MG: 10 INJECTION, EMULSION INTRAVENOUS at 08:07

## 2025-07-22 RX ADMIN — PROPOFOL 60 MG: 10 INJECTION, EMULSION INTRAVENOUS at 08:07

## 2025-07-22 RX ADMIN — SODIUM CHLORIDE: 0.9 INJECTION, SOLUTION INTRAVENOUS at 08:07

## 2025-07-22 RX ADMIN — PROPOFOL 30 MG: 10 INJECTION, EMULSION INTRAVENOUS at 08:07

## 2025-07-22 NOTE — ANESTHESIA POSTPROCEDURE EVALUATION
Anesthesia Post Evaluation    Patient: Jase Real    Procedure(s) Performed: Procedure(s) (LRB):  EGD (ESOPHAGOGASTRODUODENOSCOPY) (N/A)    Final Anesthesia Type: general      Patient location during evaluation: GI PACU  Patient participation: Yes- Able to Participate  Level of consciousness: awake and alert and oriented  Post-procedure vital signs: reviewed and stable  Pain management: adequate  Airway patency: patent    PONV status at discharge: No PONV  Anesthetic complications: no      Cardiovascular status: blood pressure returned to baseline and hemodynamically stable  Respiratory status: unassisted, spontaneous ventilation and room air  Hydration status: euvolemic  Follow-up not needed.              Vitals Value Taken Time   /55 07/22/25 09:27   Temp 36.3 °C (97.4 °F) 07/22/25 08:57   Pulse 82 07/22/25 09:27   Resp 13 07/22/25 09:27   SpO2 97 % 07/22/25 09:27         Event Time   Out of Recovery 09:49:59         Pain/Elke Score: Elke Score: 10 (7/22/2025  9:27 AM)

## 2025-07-22 NOTE — PLAN OF CARE
Procedure and recovery complete. Awake and alert.  No c/o pain or discomfort. Mother at bedside. Discharge instructions given. Verbalized understanding. No acute distress noted.

## 2025-07-22 NOTE — TRANSFER OF CARE
Anesthesia Transfer of Care Note    Patient: Jase Real    Procedure(s) Performed: Procedure(s) (LRB):  EGD (ESOPHAGOGASTRODUODENOSCOPY) (N/A)    Patient location: GI    Anesthesia Type: general    Transport from OR: Transported from OR on room air with adequate spontaneous ventilation    Post pain: adequate analgesia    Post assessment: no apparent anesthetic complications and tolerated procedure well    Post vital signs: stable    Level of consciousness: lethargic and responds to stimulation    Nausea/Vomiting: no nausea/vomiting    Complications: none    Transfer of care protocol was followed      Last vitals: Visit Vitals  /61   Pulse 85   Temp 36.3 °C (97.4 °F)   Resp 18   SpO2 96%   Breastfeeding No

## 2025-07-22 NOTE — H&P
"    Short Stay Endoscopy History and Physical    PCP - Unable, To Obtain     Procedure - EGD  ASA - per anesthesia  Mallampati - per anesthesia  History of Anesthesia problems - no  Family history Anesthesia problems -  no   Plan of anesthesia - General    HPI:  This is a 54 y.o. female here for evaluation of : follow up esophagitis, GERD.      ROS:  Constitutional: No fevers, chills, No weight loss  CV: No chest pain  Pulm: No cough, No shortness of breath  Ophtho: No vision changes  GI: see HPI  Derm: No rash    Medical History:  has a past medical history of Anxiety, Asthma, Depression, and Hypertension.    Surgical History:  has a past surgical history that includes Foot surgery (Left) and Esophagogastroduodenoscopy (N/A, 5/23/2025).    Family History: family history is not on file.. Otherwise no colon cancer, inflammatory bowel disease, or GI malignancies.    Social History:  reports that she has never smoked. She does not have any smokeless tobacco history on file. She reports that she does not drink alcohol.    Review of patient's allergies indicates:   Allergen Reactions    Red dye Other (See Comments)     Red Dye No. 40  Becomes really flush "face became red"    Baclofen     Codeine     Iodine Rash       Medications:   Prescriptions Prior to Admission[1]    Physical Exam:    Vital Signs: There were no vitals filed for this visit.    General Appearance: Well appearing in no acute distress  Abdomen: non distended     Labs:  No results found for: "WBC", "HGB", "HCT", "PLT", "CHOL", "TRIG", "HDL", "LDLDIRECT", "ALT", "AST", "NA", "K", "CL", "CREATININE", "BUN", "CO2", "TSH", "PSA", "INR", "GLUF", "HGBA1C", "MICROALBUR"    I have explained the risks and benefits of endoscopy procedures to the patient including but not limited to bleeding, perforation, infection, and death.  The patient was asked if they understand and allowed to ask any further questions to their satisfaction.      Poly Bruce MD     "     [1]   Medications Prior to Admission   Medication Sig Dispense Refill Last Dose/Taking    losartan (COZAAR) 12.5 MG tablet Take 25 mg by mouth once daily.   7/21/2025    amitriptyline (ELAVIL) 150 MG Tab Take 150 mg by mouth every evening.       diazePAM (VALIUM) 2 MG tablet Take 2 mg by mouth.       EScitalopram oxalate (LEXAPRO) 10 MG tablet Take 10 mg by mouth once daily.       levalbuterol (XOPENEX HFA) 45 mcg/actuation inhaler Inhale 1-2 puffs into the lungs every 4 (four) hours as needed for Wheezing or Shortness of Breath. Rescue 15 g 11     pantoprazole (PROTONIX) 40 MG tablet Take 1 tablet (40 mg total) by mouth 2 (two) times daily. 180 tablet 3

## 2025-07-22 NOTE — PROVATION PATIENT INSTRUCTIONS
Discharge Summary/Instructions after an Endoscopic Procedure  Patient Name: Jase Real  Patient MRN: [id number]  Patient YOB: 1970 Tuesday, July 22, 2025  Estrella Casillas MD  Dear patient,  As a result of recent federal legislation (The Federal Cures Act), you may   receive lab or pathology results from your procedure in your [facility]   account before your physician is able to contact you. Your physician or   their representative will relay the results to you with their   recommendations at their soonest availability.  Thank you,  RESTRICTIONS:  During your procedure today, you received medications for sedation.  These   medications may affect your judgment, balance and coordination.  Therefore,   for 24 hours, you have the following restrictions:   - DO NOT drive a car, operate machinery, make legal/financial decisions,   sign important papers or drink alcohol.    ACTIVITY:  Today: no heavy lifting, straining or running due to procedural   sedation/anesthesia.  The following day: return to full activity including work.  DIET:  Eat and drink normally unless instructed otherwise.     TREATMENT FOR COMMON SIDE EFFECTS:  - Mild abdominal pain, nausea, belching, bloating or excessive gas:  rest,   eat lightly and use a heating pad.  - Sore Throat: treat with throat lozenges and/or gargle with warm salt   water.  - Because air was used during the procedure, expelling large amounts of air   from your rectum or belching is normal.  - If a bowel prep was taken, you may not have a bowel movement for 1-3 days.    This is normal.  SYMPTOMS TO WATCH FOR AND REPORT TO YOUR PHYSICIAN:  1. Abdominal pain or bloating, other than gas cramps.  2. Chest pain.  3. Back pain.  4. Signs of infection such as: chills or fever occurring within 24 hours   after the procedure.  5. Rectal bleeding, which would show as bright red, maroon, or black stools.   (A tablespoon of blood from the rectum is not serious, especially if    hemorrhoids are present.)  6. Vomiting.  7. Weakness or dizziness.  GO DIRECTLY TO THE NEAREST EMERGENCY ROOM IF YOU HAVE ANY OF THE FOLLOWING:      Difficulty breathing              Chills and/or fever over 101 F   Persistent vomiting and/or vomiting blood   Severe abdominal pain   Severe chest pain   Black, tarry stools   Bleeding- more than one tablespoon   Any other symptom or condition that you feel may need urgent attention  Your doctor recommends these additional instructions:  If any biopsies were taken, your doctors clinic will contact you in 1 to 2   weeks with any results.  - Patient has a contact number available for emergencies.  The signs and   symptoms of potential delayed complications were discussed with the   patient.  Return to normal activities tomorrow.  Written discharge   instructions were provided to the patient.   - Discharge patient to home.   - Resume previous diet.   - Continue present medications.   - Use a proton pump inhibitor PO BID.   - Use sucralfate suspension 1 gram PO QID.  -Proceed with Esophagram and manometry ordered by Dr. Cullen.  - Follow up with Dr. Cullen after testing is completed for possible   hiatal hernia repair.  For questions, problems or results please call your physician - Estrella Casillas MD at Work:  [phone].  [facility] can be reached at [phone]     IF A COMPLICATION OR EMERGENCY SITUATION ARISES AND YOU ARE UNABLE TO REACH   YOUR PHYSICIAN - GO DIRECTLY TO THE EMERGENCY ROOM.  Estrella Casillas MD  7/22/2025 9:01:02 AM  This report has been verified and signed electronically.  Dear patient,  As a result of recent federal legislation (The Federal Cures Act), you may   receive lab or pathology results from your procedure in your [facility]   account before your physician is able to contact you. Your physician or   their representative will relay the results to you with their   recommendations at their  soonest availability.  Thank you,  PROVATION

## 2025-07-22 NOTE — ANESTHESIA PREPROCEDURE EVALUATION
"                                                                                                             07/22/2025    Pre-operative evaluation for Procedure(s) (LRB):  EGD (ESOPHAGOGASTRODUODENOSCOPY) (N/A)    Jase Real is a 54 y.o. female     Patient Active Problem List   Diagnosis    Anxiety    Anxiety and depression    Asthma    Carpal tunnel syndrome, right upper limb    Cerebral palsy    Dysmenorrhea    HTN (hypertension), benign    Major depressive disorder, recurrent episode, mild    Mixed hyperlipidemia    Pain in right hand    VANDANA (stress urinary incontinence, female)    Simple chronic bronchitis    Gastroesophageal reflux disease with esophagitis without hemorrhage    Pneumonitis    Acid fast bacilli (AFB) present in sputum       Review of patient's allergies indicates:   Allergen Reactions    Red dye Other (See Comments)     Red Dye No. 40  Becomes really flush "face became red"    Baclofen     Codeine     Iodine Rash       Medications Ordered Prior to Encounter[1]    Past Surgical History:   Procedure Laterality Date    ESOPHAGOGASTRODUODENOSCOPY N/A 5/23/2025    Procedure: EGD (ESOPHAGOGASTRODUODENOSCOPY);  Surgeon: Estrella Casillas MD;  Location: [facility];  Service: Gastroenterology;  Laterality: N/A;  Referral: KARELY MIRELES / cece emailed: [email or web address] -   5/12 R/S: [facility] emailed - LW    FOOT SURGERY Left        Social History[2]          Pre-op Assessment    I have reviewed the Patient Summary Reports.     I have reviewed the Nursing Notes.       Review of Systems  Anesthesia Hx:  No problems with previous Anesthesia               Denies Personal Hx of Anesthesia complications.                    Social:  No Alcohol Use, Non-Smoker       Cardiovascular:  Exercise tolerance: good   Hypertension           hyperlipidemia                               Pulmonary:    Asthma                    Hepatic/GI:     GERD, well controlled                Neurological:           " Cerebral Palsy                            Endocrine:  Endocrine Normal          Obesity / BMI > 30  Psych:  Psychiatric History anxiety depression                Physical Exam  General: Well nourished and Cooperative    Airway:  Mallampati: II   Mouth Opening: Normal  TM Distance: Normal  Tongue: Normal    Dental:  Intact    Chest/Lungs:  Normal Respiratory Rate    Heart:  Rate: Normal  Rhythm: Regular Rhythm        Anesthesia Plan  Type of Anesthesia, risks & benefits discussed:    Anesthesia Type: Gen ETT  Intra-op Monitoring Plan: Standard ASA Monitors  Post Op Pain Control Plan: multimodal analgesia  Induction:  IV  Informed Consent: Informed consent signed with the Patient and all parties understand the risks and agree with anesthesia plan.  All questions answered.   ASA Score: 2    Ready For Surgery From Anesthesia Perspective.     .           [1]   No current facility-administered medications on file prior to encounter.     Current Outpatient Medications on File Prior to Encounter   Medication Sig Dispense Refill    amitriptyline (ELAVIL) 150 MG Tab Take 150 mg by mouth every evening.      diazePAM (VALIUM) 2 MG tablet Take 2 mg by mouth.      EScitalopram oxalate (LEXAPRO) 10 MG tablet Take 10 mg by mouth once daily.      levalbuterol (XOPENEX HFA) 45 mcg/actuation inhaler Inhale 1-2 puffs into the lungs every 4 (four) hours as needed for Wheezing or Shortness of Breath. Rescue 15 g 11    losartan (COZAAR) 12.5 MG tablet Take 25 mg by mouth once daily.      pantoprazole (PROTONIX) 40 MG tablet Take 1 tablet (40 mg total) by mouth 2 (two) times daily. 180 tablet 3   [2]   Social History  Socioeconomic History    Marital status: Single   Tobacco Use    Smoking status: Never   Substance and Sexual Activity    Alcohol use: No

## 2025-07-23 ENCOUNTER — TELEPHONE (OUTPATIENT)
Dept: GASTROENTEROLOGY | Facility: CLINIC | Age: 55
End: 2025-07-23
Payer: MEDICARE

## 2025-07-23 NOTE — TELEPHONE ENCOUNTER
----- Message from Estrella Casillas MD sent at 7/23/2025  9:13 AM CDT -----  Regarding: RE: Schedule esophagram  Her mom would prefer   ----- Message -----  From: Ree Perez MA  Sent: 7/23/2025   8:26 AM CDT  To: Estrella Casillas MD  Subject: RE: Schedule esophagram                          Does it matter if it's done at  or ?  ----- Message -----  From: Estrella Casillas MD  Sent: 7/22/2025   9:03 AM CDT  To: [facility] Gastroenterology Clinical Support  Subject: Schedule esophagram                              Good morning,    Dr. Cullen ordered an esophagram for this patient but it is not scheduled, can you schedule it?  She just finished with her EGD so may be best to call her tomorrow.    Thank you,    Estrella

## 2025-07-23 NOTE — TELEPHONE ENCOUNTER
----- Message from Estrella Casillas MD sent at 7/23/2025  9:13 AM CDT -----  Regarding: RE: Schedule esophagram  Her mom would prefer   ----- Message -----  From: Ree Perze MA  Sent: 7/23/2025   8:26 AM CDT  To: Estrella Casillas MD  Subject: RE: Schedule esophagram                          Does it matter if it's done at  or ?  ----- Message -----  From: Estrella Casillas MD  Sent: 7/22/2025   9:03 AM CDT  To: [facility] Gastroenterology Clinical Support  Subject: Schedule esophagram                              Good morning,    Dr. Cullen ordered an esophagram for this patient but it is not scheduled, can you schedule it?  She just finished with her EGD so may be best to call her tomorrow.    Thank you,    Estrella

## 2025-07-25 ENCOUNTER — HOSPITAL ENCOUNTER (OUTPATIENT)
Dept: RADIOLOGY | Facility: HOSPITAL | Age: 55
Discharge: HOME | End: 2025-07-25
Attending: SURGERY
Payer: MEDICARE

## 2025-07-25 DIAGNOSIS — K44.9 HIATAL HERNIA: ICD-10-CM

## 2025-07-25 PROCEDURE — 74220 X-RAY XM ESOPHAGUS 1CNTRST: CPT | Mod: TC

## 2025-07-25 PROCEDURE — 74220 X-RAY XM ESOPHAGUS 1CNTRST: CPT | Mod: 26,,, | Performed by: RADIOLOGY

## 2025-07-29 ENCOUNTER — OFFICE VISIT (OUTPATIENT)
Dept: PULMONOLOGY | Facility: CLINIC | Age: 55
End: 2025-07-29
Payer: MEDICARE

## 2025-07-29 VITALS
HEART RATE: 100 BPM | WEIGHT: 177.94 LBS | OXYGEN SATURATION: 97 % | BODY MASS INDEX: 31.52 KG/M2 | DIASTOLIC BLOOD PRESSURE: 77 MMHG | SYSTOLIC BLOOD PRESSURE: 120 MMHG

## 2025-07-29 DIAGNOSIS — J45.20 INTERMITTENT ASTHMA, UNSPECIFIED ASTHMA SEVERITY, UNSPECIFIED WHETHER COMPLICATED: ICD-10-CM

## 2025-07-29 DIAGNOSIS — R91.8 OTHER NONSPECIFIC ABNORMAL FINDING OF LUNG FIELD: ICD-10-CM

## 2025-07-29 DIAGNOSIS — J41.0 SIMPLE CHRONIC BRONCHITIS: ICD-10-CM

## 2025-07-29 DIAGNOSIS — J69.0 ASPIRATION PNEUMONITIS: Primary | ICD-10-CM

## 2025-07-29 DIAGNOSIS — R91.8 PULMONARY NODULES: ICD-10-CM

## 2025-07-29 PROCEDURE — 3074F SYST BP LT 130 MM HG: CPT | Mod: CPTII,S$GLB,, | Performed by: INTERNAL MEDICINE

## 2025-07-29 PROCEDURE — 4010F ACE/ARB THERAPY RXD/TAKEN: CPT | Mod: CPTII,S$GLB,, | Performed by: INTERNAL MEDICINE

## 2025-07-29 PROCEDURE — 99214 OFFICE O/P EST MOD 30 MIN: CPT | Mod: S$GLB,,, | Performed by: INTERNAL MEDICINE

## 2025-07-29 PROCEDURE — 3008F BODY MASS INDEX DOCD: CPT | Mod: CPTII,S$GLB,, | Performed by: INTERNAL MEDICINE

## 2025-07-29 PROCEDURE — 1159F MED LIST DOCD IN RCRD: CPT | Mod: CPTII,S$GLB,, | Performed by: INTERNAL MEDICINE

## 2025-07-29 PROCEDURE — 99999 PR PBB SHADOW E&M-EST. PATIENT-LVL III: CPT | Mod: PBBFAC,,, | Performed by: INTERNAL MEDICINE

## 2025-07-29 PROCEDURE — 3078F DIAST BP <80 MM HG: CPT | Mod: CPTII,S$GLB,, | Performed by: INTERNAL MEDICINE

## 2025-07-29 RX ORDER — LEVALBUTEROL TARTRATE 45 UG/1
1-2 AEROSOL, METERED ORAL EVERY 4 HOURS PRN
Qty: 15 G | Refills: 11 | Status: SHIPPED | OUTPATIENT
Start: 2025-07-29

## 2025-07-29 NOTE — PROGRESS NOTES
General Pulmonary Clinic  Follow Up Patient Visit      Chief Complaint: aspiration pneumonitis, bronchitis     HPI     Jase Real is a 54 y.o. female with hx of CP w/ L sided deficits, ?asthma, HTN,  presenting with chief complaint of aspiration pneumonitis, bronchitis    Interval hx:  5/5/25 AFB culture + MAC  6/19/25 AFB NGTD    CT chest 7/21/25 personally reviewed lung portion  - improved GGO in the LLL with improvement in RLL  - lung nodules 4-5 mm RLL and  5 mm LL  - dilated esophagus    Had EGD 7/22/25 with grade D esophagitis w/ oozing, large hiatal hernia      Cough has improved but had one episode of sudden shortness of breath that resolved in seconds    Presenting HPI    RESPIRATORY SYMPTOMS:  She reports coughing up brown phlegm in the morning for approximately one year, accompanied by shortness of breath without wheezing. These symptoms are isolated to mornings and do not persist throughout the day. 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    ASTHMA:  She was diagnosed with asthma in her 40s, specifically identified as morning asthma by Dr. Paredes and confirmed by Dr. Multani after a breathing treatment. She has a family history of asthma in her brother. She uses Xopenex as needed, approximately 1-2 times weekly, and has declined steroid inhalers citing ineffectiveness.    MEDICAL HISTORY:  She has a history of pneumonia in adulthood, timeframe unknown. 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 She recently experienced a fall at home on Sunday, resulting in a head injury and bruising after hitting her head on a door frame.    SURGICAL HISTORY:  She has undergone foot surgery and surgical removal of cysts from arm.    ALLERGIES:  She has documented allergies to baclofen, codeine, and iodine. Allergy testing revealed sensitivities to trees, grass, mold, corn, and  dogs.    CURRENT MEDICATIONS:  She takes omeprazole daily in the morning for acid reflux management.      ROS:  General: -fever, -chills, -fatigue, -weight gain, -weight loss  Eyes: -vision changes, -redness, -discharge  ENT: -ear pain, -nasal congestion, -sore throat, +hoarseness, +sinus pressure, +nosebleeds  Cardiovascular: -chest pain, -palpitations, -lower extremity edema  Respiratory: -cough, +shortness of breath, +productive cough, +exertional dyspnea  Gastrointestinal: -abdominal pain, -nausea, -vomiting, -diarrhea, -constipation, -blood in stool, +difficulty swallowing, +heartburn, +indigestion  Genitourinary: -dysuria, -hematuria, -frequency  Musculoskeletal: -joint pain, -muscle pain  Skin: -rash, -lesion  Neurological: -headache, -dizziness, -numbness, -tingling  Psychiatric: -anxiety, -depression, -sleep difficulty  Allergic: +allergic reactions         Objective   Past History     Past Medical History:  Past Medical History:   Diagnosis Date    Anxiety     Asthma     Depression     Hypertension          Past Surgical History:  Past Surgical History:   Procedure Laterality Date    ESOPHAGOGASTRODUODENOSCOPY N/A 5/23/2025    Procedure: EGD (ESOPHAGOGASTRODUODENOSCOPY);  Surgeon: Estrella Casillas MD;  Location: [facility];  Service: Gastroenterology;  Laterality: N/A;  Referral: KARELY MIRELES / cece emailed: [email or web address] -   5/12 R/S: [facility] emailed - LW    ESOPHAGOGASTRODUODENOSCOPY N/A 7/22/2025    Procedure: EGD (ESOPHAGOGASTRODUODENOSCOPY);  Surgeon: Estrella Casillas MD;  Location: [facility];  Service: Endoscopy;  Laterality: N/A;  referral from  f/u in 8 weeks to check for healing. egd instructions  to pt portal.cf    FOOT SURGERY Left          Social History:   Social History     Socioeconomic History    Marital status: Single   Tobacco Use    Smoking status: Never   Substance and Sexual Activity    Alcohol use: No         Family Hx:  No family history of pulmonary fibrosis,  "pre-mature graying of hair, cirrhosis, or hematologic malignancies  No family history of emphysema or spontaneous PTX  no family history of lung cancer or lymphoma  Brother - asthma    Allergies and Pertinent Medications   Allergies and Medications Reviewed    Red dye, Baclofen, Codeine, and Iodine  [unfilled]             Physical Exam   /77   Pulse 100   Wt 80.7 kg (177 lb 14.6 oz)   SpO2 97%   BMI 31.52 kg/m²       Constitutional: No acute distress, Atraumatic   HEENT: moist mucus membranes, extraocular movements intact  Cardiovascular: regular rate and rhythm, no murmurs, rubs or gallops  Pulmonary: normal respiratory rate and chest rise, no chest wall deformity, normal breath sounds with no wheezing or crackles  Abdominal: non-distended, bowel sounds present  Musculoskeletal: No lower extremity edema, no clubbing  Neurological: normal speech/rosa, L sided weakness  Skin: no finger cyanosis, no rashes on exposed body parts  Psych: Appropriate affect, normal mood       Diagnostic Studies      All diagnostic studies relevant to chief complaint reviewed personally    Labs:  No results found for: "WBC", "HGB", "PLT", "MCV"  No results found for: "NA", "K", "CO2", "BUN", "CREATININE", "MG"  No results found for: "AST", "ALT", "ALBUMIN", "PROT", "BILITOT"      PFTs:  Pulmonary Functions Testing Results:  No results found for: "FEV1", "FVC", "FKZ8QQS", "TLC", "DLCO"       TTE:  No results found for this or any previous visit.            Assessment and Plan   Jase Real is a 54 y.o. female  presenting with chief complaint of productive cough/chronic bronchitis w/ evidence of pneumonitis on CT chest    Problem List:  # aspiration pneumonitis -- acute, stable - multilobar, largely affecting left lung and RLL. In setting of hiatal hernia and GERD concerning for aspiration pneumonitis. Autoimmune testing negative. She is s/p augmentin and prednisone taper, then PPI w/ improvement in cough. CT chest " with further improvement but mild residual GGO munir in LLL.   - consider repeat PFTs in 6 months    # cough bronchitis - chronic, stable - no clear obstruction on pfts. dx in adulthood, unclear if she actually has asthma given minimal relief w/ inhalers  - xopenex prn  - note: she is medically optimized at this point for esophageal surgery. There are no pulmonary contraindications to proceeding    # sputum culture + MAC - in 1 out of 2 cultures not consistent w/ infection, ct imaging also not consistent w/ this likely contaminant    # pulmonary nodules < 6 mm - RLL noted in 5/2025 CT chest, repeat in   7/2026    Explained to the patient I work Monday-Thursdays, so any results or messages received after working hours Thursday through Monday AM would not be addressed until I was back in the office. If he/she experienced any acute symptoms he/she should go the emergency room for immediate help.    Differential, diagnoses, diagnostic work up, and possible treatments were discussed with the patient. Questions were answered.    Return in 6 months    Chasity Summers MD  Pulmonary-Critical Care Medicine              For this visit, the following time was spent  preparing to see the patient (e.g., review of tests) 10 minutes  obtaining and/or reviewing separately obtained history 0 minutes  Performing a medically necessary appropriate examination and/or evaluation 10 minutes  Counseling and educating the patient/family/caregiver 10 minutes  Ordering medications, tests, or procedures 2 minutes  Referring and communicating with other health care professionals (when not reported separately) 0minutes  Documenting clinical information in the electronic or other health record 5minutes  Care coordination (not reported separately) 0minutes    Total time = 37 minutes

## 2025-07-30 ENCOUNTER — TELEPHONE (OUTPATIENT)
Dept: GASTROENTEROLOGY | Facility: CLINIC | Age: 55
End: 2025-07-30
Payer: MEDICARE

## 2025-07-30 NOTE — TELEPHONE ENCOUNTER
MA returned call/spoke with patient. Let her know per provider that she can cut the pill in half (Sucralfate). Patient verbalized understanding.

## 2025-07-30 NOTE — TELEPHONE ENCOUNTER
Copied from CRM #[id number]. Topic: General Inquiry - Patient Advice  >> Jul 30, 2025 10:44 AM Tamy wrote:  Type: Patient Call Back    Who called: Patient Mom    What is the request in detail: Pt mom is calling in regards to the medication she is taking . The pill is to big for the pt to swallow and would like to know if it is okay to cut the pill in half to take . Please Advise       Can the clinic reply by [facility]? No     Would the patient rather a call back or a response via [facility]?  Call     Best call back number: [phone]       Additional Information:

## 2025-08-06 ENCOUNTER — TELEPHONE (OUTPATIENT)
Dept: GASTROENTEROLOGY | Facility: CLINIC | Age: 55
End: 2025-08-06
Payer: MEDICARE

## 2025-08-06 NOTE — TELEPHONE ENCOUNTER
MA returned call/spoke with patient. Patient states that she needs Dr Casillas to fill out a form so she can take her medication at school. Asked patient to drop off the form and I will have Dr Casillas fill it out on Friday when she is in clinic. Patient MANPREET.

## 2025-08-06 NOTE — TELEPHONE ENCOUNTER
Copied from CRM #[id number]. Topic: General Inquiry - Patient Advice  >> Aug 6, 2025 12:03 PM Shanna Sharon wrote:  Type: Patient Call Back    Who called: self    What is the request in detail:states she needs the office to fill out paperwork in order for her to take medication     Can the clinic reply by [facility]?no     Would the patient rather a call back or a response via [facility]? Call     Best call back number: .[phone]    Additional Information:

## 2025-08-11 ENCOUNTER — TELEPHONE (OUTPATIENT)
Dept: ENDOSCOPY | Facility: HOSPITAL | Age: 55
End: 2025-08-11
Payer: MEDICARE

## 2025-08-15 ENCOUNTER — TELEPHONE (OUTPATIENT)
Dept: ENDOSCOPY | Facility: HOSPITAL | Age: 55
End: 2025-08-15
Payer: MEDICARE

## 2025-08-26 ENCOUNTER — TELEPHONE (OUTPATIENT)
Dept: ENDOSCOPY | Facility: HOSPITAL | Age: 55
End: 2025-08-26
Payer: MEDICARE